# Patient Record
Sex: FEMALE | Race: WHITE | Employment: UNEMPLOYED | URBAN - METROPOLITAN AREA
[De-identification: names, ages, dates, MRNs, and addresses within clinical notes are randomized per-mention and may not be internally consistent; named-entity substitution may affect disease eponyms.]

---

## 2022-03-18 PROBLEM — U07.1 COVID-19 VIRUS INFECTION: Status: ACTIVE | Noted: 2022-01-14

## 2022-03-18 PROBLEM — F43.10 PTSD (POST-TRAUMATIC STRESS DISORDER): Status: ACTIVE | Noted: 2022-01-14

## 2022-03-18 PROBLEM — R73.03 PREDIABETES: Status: ACTIVE | Noted: 2022-01-17

## 2022-03-18 PROBLEM — R63.5 UNEXPLAINED WEIGHT GAIN: Status: ACTIVE | Noted: 2022-01-14

## 2022-03-19 PROBLEM — E78.5 DYSLIPIDEMIA: Status: ACTIVE | Noted: 2022-01-14

## 2022-03-19 PROBLEM — Z86.32 HX GESTATIONAL DIABETES: Status: ACTIVE | Noted: 2022-01-14

## 2022-03-19 PROBLEM — N93.9 VAGINAL BLEEDING: Status: ACTIVE | Noted: 2019-10-25

## 2022-03-19 PROBLEM — D50.9 IRON DEFICIENCY ANEMIA: Status: ACTIVE | Noted: 2017-09-27

## 2022-03-19 PROBLEM — Z92.29 COVID-19 VACCINE SERIES COMPLETED: Status: ACTIVE | Noted: 2022-01-14

## 2022-03-20 PROBLEM — E55.9 VITAMIN D DEFICIENCY: Status: ACTIVE | Noted: 2022-01-17

## 2022-05-20 PROBLEM — E66.9 OBESE BODY HABITUS: Status: ACTIVE | Noted: 2022-05-20

## 2022-05-20 PROBLEM — M62.830 MUSCLE SPASM OF BACK: Status: ACTIVE | Noted: 2022-05-20

## 2022-10-11 PROBLEM — D64.9 ANEMIA: Status: ACTIVE | Noted: 2022-10-11

## 2022-10-11 PROBLEM — K65.1 INTRA-ABDOMINAL ABSCESS (HCC): Status: ACTIVE | Noted: 2022-10-11

## 2022-10-11 PROBLEM — T81.9XXA SURGICAL COMPLICATION: Status: ACTIVE | Noted: 2022-10-11

## 2022-10-11 PROBLEM — L02.211 ABDOMINAL WALL ABSCESS: Status: ACTIVE | Noted: 2022-10-11

## 2022-10-13 PROBLEM — I82.451 ACUTE DEEP VEIN THROMBOSIS (DVT) OF RIGHT PERONEAL VEIN (HCC): Status: ACTIVE | Noted: 2022-10-13

## 2023-01-06 PROBLEM — E78.3 MIXED HYPERGLYCERIDEMIA: Status: ACTIVE | Noted: 2023-01-06

## 2023-01-06 PROBLEM — E78.5 DYSLIPIDEMIA: Status: RESOLVED | Noted: 2022-01-14 | Resolved: 2023-01-06

## 2023-01-06 PROBLEM — D50.8 IRON DEFICIENCY ANEMIA FOLLOWING BARIATRIC SURGERY: Status: ACTIVE | Noted: 2017-09-27

## 2023-01-06 PROBLEM — K95.89 IRON DEFICIENCY ANEMIA FOLLOWING BARIATRIC SURGERY: Status: ACTIVE | Noted: 2017-09-27

## 2023-01-06 PROBLEM — E66.9 OBESE BODY HABITUS: Status: RESOLVED | Noted: 2022-05-20 | Resolved: 2023-01-06

## 2023-01-06 PROBLEM — R03.0 ELEVATED BLOOD PRESSURE READING IN OFFICE WITHOUT DIAGNOSIS OF HYPERTENSION: Status: ACTIVE | Noted: 2023-01-06

## 2023-01-06 PROBLEM — D64.9 ANEMIA: Status: RESOLVED | Noted: 2022-10-11 | Resolved: 2023-01-06

## 2023-01-06 PROBLEM — R63.5 WEIGHT GAIN: Status: ACTIVE | Noted: 2023-01-06

## 2023-02-02 PROBLEM — M47.816 OSTEOARTHRITIS OF LUMBAR SPINE: Status: ACTIVE | Noted: 2023-02-02

## 2023-02-14 ENCOUNTER — TELEPHONE (OUTPATIENT)
Age: 33
End: 2023-02-14

## 2023-02-14 NOTE — TELEPHONE ENCOUNTER
1st attempt to schedule appointment. Schedule New Patient appointment with Dr Tono Yen or offer patient CB office which is closer to her home.     Osteoarthritis of lumbar spine/no imaging/? Prev sx/Ref by TANIA Martin

## 2023-02-16 ENCOUNTER — HOSPITAL ENCOUNTER (OUTPATIENT)
Facility: HOSPITAL | Age: 33
Discharge: HOME | End: 2023-02-19

## 2023-02-16 VITALS — HEIGHT: 65 IN | BODY MASS INDEX: 32.99 KG/M2 | WEIGHT: 198 LBS

## 2023-02-16 NOTE — PROGRESS NOTES
Patient's Name: Meme Ferrara Age: 34 y.o. YOB: 1990 Sex: female     Date:  2/16/2023    Ht 5' 5\" (1.651 m)   Wt 198 lb (89.8 kg)   BMI 32.95 kg/m²     Pre-Op Weight: 260 lbs. Overall Pounds Lost: 62 lbs. %EBWL:  56%  Note visit was virtual using audio only technology. Weight was stated per pt's home scale. Reports wt was 208 lbs 2 weeks ago and 198 lbs last week. Medical records from other facilities reviewed in [facility]; wt 207.5 at [address] [facility] visit on 1/3/23. Currently weighs herself 1x/wk on Fridays.       Surgery Date: 5/13/16  Procedure: LGBP    Other Pertinent Information: Pt reports having plastic surgery    Smoking:  [x] No [] Yes  Type/Frequency: n/a    Alcohol Intake:  [x] No  [] Yes       Diet History    Patient's current diet habits include: reports has been meal prepping once a week for 6 days worth of 3 meals/day; states using lower calorie, higher protein recipes from nutritionist on [facility] (<500 kcal total for lunch & PM snack/dinner); reports not recording intake daily in food journaling diogenes since meals repeat every 6 days, is weighing/measuring food portions; states feels like she is able to eat larger portions than she should, reports has a f/u scheduled with Dr. Marva Osei in April to discuss this  6:00-7:00 AM - UP  7:00 AM-9:00 AM - BREAKFAST: 1/2 breakfast sandwich (low-carb bread, ham, egg, cheese) OR protein shake, coffee w/ SF creamer; states skips ~2x/wk  12:00 PM - LUNCH: chicken, rice, veggies (1/2 portion) OR egg white, vegetable, potato casserole  3:00-4:00 PM - SNACK; rest of lunch (1/3-1/2 portion) OR protein cake(~4x/wk)  5:30-7:00 PM - DINNER: rest of lunch (1/3-1/2 portion) OR taco (roasted chicken breast, salsa, low carb tortilla)    DESSERT: Halo ice cream (<1x/wk) OR low carb cheese cake w/ PB2, cream cheese, Greek yogurt, eggs, no crust or almond flour crust (1 slice this week)  FLUIDS: Cirkul water (96 oz/d), protein shakes, coffee    Protein supplement intake: Ethan OR Premier mixed w/ coffee OR Fit Frappe (blended w/ ice, mint extract OR SF syrup) - 3-4x/wk as breakfast replacement      Exercise History    Patient is currently using the stair stepper 15-20 min/d and doing full body circuit training for 60-75 min/d, 5d/wk. Vitamins    [x] Patient is taking Multivitamins, adult tablet, 2 per day. [] Patient is taking Calcium daily. [x] Patient is taking sublingual Vitamin B12, 5,000 mcg  per day (states resumed d/t fatigue since last visit). [x] Patient is taking Vitamin D3, 10,000 international units per day. [x] Patient is taking Vitamin B-complex daily ((states resumed d/t fatigue since last visit). [] Patient is taking additional iron daily (Hx of iron-deficiency anemia, Hgb 13.8 (1/10/23, [facility]). Patient's progress towards goals set at last visit:  1. Pt will resume meal preparation, focusing on lean meats and non-starchy vegetables, for at least 1 meal/d (lunch) by next appointment   Pt has met this goal; she has been meal prepping for 3 meals/d     2. Pt to have protein supplement twice daily as meal replacement (breakfast and dinner) by next appointment Pt did not utilize protein shakes regularly due to meal prepping more 3 meals/d since last visit. 3.  Pt will resume baking low-sugar desserts, such as protein mug cakes, to use in place of sweets currently being prepared/consumed when having sugar cravings by next appointment  Pt has met this goal.     4.  Pt will continue current regular physical activity regimen, aiming for 5 d/wk by next appointment. Pt has met this goal    Summary:   I have reinforced the key diet principles to the patient. Patient was instructed to follow a 3 meal per day routine, consume 800-1000 kcal/d,  g/d protein, limit carbohydrate intake to 50-75 g/d. Recommend using food tracking diogenes to ensure adequate protein and caloric intake.      I have reinforced the importance of filling up on meat and non-starchy vegetables and avoiding carbohydrates. I have talked with patient about reactive hypoglycemia and although carbohydrates are not good from a weight-management point of view, they are actually very dangerous and should be avoided. If patient is getting hungry between meals focus on protein-forward meals/snacks with non-starchy vegetables. A list of food choices was reviewed with patient. Reinforced to patient the importance of being properly hydrated and the need to get 64 ounces of fluid (sugar-free) in per day. Reinforced to patient the need to do 60-90 minutes of aerobic activity 5 days a week and strength training 2 days each week for long-term weight loss and maintenance. We also spent some time talking about the required vitamin/mineral supplements and the importance of taking them as prescribed. Reinforced the dosages of vitamins/minerals to patient. Patient was reminded that the prescribed vitamins/minerals are lifelong requirements to prevent micronutrient deficiencies. All patient's questions were answered, and they were provided with my contact information for future nutrition-related questions or concerns. Pt is scheduled for follpw-up nutrition counseling on 5/18/23.     LUIS CARLOS BROWER RD

## 2023-02-21 ENCOUNTER — OFFICE VISIT (OUTPATIENT)
Age: 33
End: 2023-02-21
Payer: MEDICAID

## 2023-02-21 VITALS
SYSTOLIC BLOOD PRESSURE: 130 MMHG | BODY MASS INDEX: 33.05 KG/M2 | TEMPERATURE: 98.5 F | RESPIRATION RATE: 18 BRPM | OXYGEN SATURATION: 100 % | HEIGHT: 65 IN | WEIGHT: 198.4 LBS | DIASTOLIC BLOOD PRESSURE: 101 MMHG | HEART RATE: 82 BPM

## 2023-02-21 DIAGNOSIS — R94.31 ABNORMAL EKG: ICD-10-CM

## 2023-02-21 DIAGNOSIS — Z82.49 FAMILY HISTORY OF HEART DISEASE: ICD-10-CM

## 2023-02-21 DIAGNOSIS — I10 HYPERTENSION, UNSPECIFIED TYPE: Primary | ICD-10-CM

## 2023-02-21 DIAGNOSIS — F90.0 ATTENTION DEFICIT HYPERACTIVITY DISORDER (ADHD), PREDOMINANTLY INATTENTIVE TYPE: ICD-10-CM

## 2023-02-21 PROCEDURE — 99213 OFFICE O/P EST LOW 20 MIN: CPT | Performed by: NURSE PRACTITIONER

## 2023-02-21 PROCEDURE — 3080F DIAST BP >= 90 MM HG: CPT | Performed by: NURSE PRACTITIONER

## 2023-02-21 PROCEDURE — 3075F SYST BP GE 130 - 139MM HG: CPT | Performed by: NURSE PRACTITIONER

## 2023-02-21 RX ORDER — METHYLPHENIDATE HYDROCHLORIDE 36 MG/1
36 TABLET ORAL EVERY MORNING
COMMUNITY

## 2023-02-21 RX ORDER — LOSARTAN POTASSIUM 25 MG/1
25 TABLET ORAL DAILY
Qty: 30 TABLET | Refills: 0 | Status: SHIPPED | OUTPATIENT
Start: 2023-02-21

## 2023-02-21 SDOH — ECONOMIC STABILITY: FOOD INSECURITY: WITHIN THE PAST 12 MONTHS, YOU WORRIED THAT YOUR FOOD WOULD RUN OUT BEFORE YOU GOT MONEY TO BUY MORE.: NEVER TRUE

## 2023-02-21 SDOH — ECONOMIC STABILITY: HOUSING INSECURITY
IN THE LAST 12 MONTHS, WAS THERE A TIME WHEN YOU DID NOT HAVE A STEADY PLACE TO SLEEP OR SLEPT IN A SHELTER (INCLUDING NOW)?: NO

## 2023-02-21 SDOH — ECONOMIC STABILITY: FOOD INSECURITY: WITHIN THE PAST 12 MONTHS, THE FOOD YOU BOUGHT JUST DIDN'T LAST AND YOU DIDN'T HAVE MONEY TO GET MORE.: NEVER TRUE

## 2023-02-21 NOTE — PROGRESS NOTES
Mic Hubbard presents today for   Chief Complaint   Patient presents with    Hypertension     Pt c/o recent elevated BP readings since starting new medication    Referral - General     Pt requesting referral to cardiology       1. \"Have you been to the ER, urgent care clinic since your last visit? Hospitalized since your last visit? \" Yes.    2. \"Have you seen or consulted any other health care providers outside of the [address] [place] [address] since your last visit? \" No.     3. For patients aged 39-70: Has the patient had a colonoscopy / FIT/ Cologuard? N/A      If the patient is female:    4. For patients aged 41-77: Has the patient had a mammogram within the past 2 years? N/a  See top three    5. For patients aged 21-65: Has the patient had a pap smear?  Yes.

## 2023-02-21 NOTE — PROGRESS NOTES
Internists of [address] [facility], [address] [place]  [phone] [facility]/[phone] fax    2/21/2023    Kelly Robertson 1990 is a pleasant White (non-) female. Todays concern/HPI:  Believes concerta is elevating BP. Past hx of HTN. Started concerta Jan 7385. BP average 140s/90s. Readings on and off concerta. Seeking referral to cardio due to family hx of heart disease and her having abnormal ekg. In ED recently. Was told abnormal EKG. Provider wanted to admit pt to complete stress echo but due to her dtrs Bday she declined admit. She returned back to [facility] to complete test but order was not available. No complaints or concerns. Denies CP, SOB, GI/ issues, dizziness, fatigue. Past Medical History:   Diagnosis Date    Acute deep vein thrombosis (DVT) of right peroneal vein (HCC) 10/13/2022    Adverse effect of anesthesia     \"Took a little bit longer to wake up and longer to put me out\"    Alpha thalassemia ([facility] [address]     Dx 1/2015 during pregnancy    Arthritis 2017    Asthma     Calculus of kidney 2017    COVID-19 virus infection 01/14/2022    Depression 2007    Dyslipidemia 01/14/2022    Fatty liver disease, nonalcoholic     confirmed via US    GERD (gastroesophageal reflux disease)     resolved    H/O gastric bypass 05/2016    Hidradenitis suppurativa     History of miscarriage     5 total as of 2016    Hx gestational diabetes 01/14/2022    Hypercholesterolemia     resolved    Hypertension     resolved    Hypothyroid     Iron deficiency anemia 09/27/2017    Secondary to gastric bypass.  Dr [facility]    Morbid obesity with body mass index of 40.0-49.9 (HCC)     PCOS (polycystic ovarian syndrome)     no medication    PTSD (post-traumatic stress disorder) 01/14/2022    PUD (peptic ulcer disease) 2019    S/P bariatric surgery 05/19/2016    5/3/16 - [facility] GBP - [facility]     Sleep apnea 2012    mild, no CPAP needed per sleep study    JESSICA (stress urinary incontinence, female)     Trauma 2012    Cptsd diagnosis     Current Outpatient Medications   Medication Sig    methylphenidate (CONCERTA) 36 MG extended release tablet Take 36 mg by mouth every morning. losartan (COZAAR) 25 MG tablet Take 1 tablet by mouth daily    apixaban (ELIQUIS) 5 MG TABS tablet Take 2.5 mg by mouth 2 times daily    ARIPiprazole (ABILIFY) 5 MG tablet 5 mg daily    busPIRone (BUSPAR) 7.5 MG tablet Take 7.5 mg by mouth 2 times daily    cyclobenzaprine (FLEXERIL) 10 MG tablet Take 10 mg by mouth    hydrOXYzine HCl (ATARAX) 50 MG tablet TAKE 1 TABLET BY MOUTH UP TO TWICE A DAY AS NEEDED FOR ANXIETY    levothyroxine (SYNTHROID) 75 MCG tablet Take 75 mcg by mouth every morning (before breakfast)    sertraline (ZOLOFT) 100 MG tablet Take by mouth daily     No current facility-administered medications for this visit. Physical:   BP (!) 130/101   Pulse 82   Temp 98.5 °F (36.9 °C) (Temporal)   Resp 18   Ht 5' 5\" (1.651 m)   Wt 198 lb 6.4 oz (90 kg)   SpO2 100%   BMI 33.02 kg/m²    Wt Readings from Last 3 Encounters:   02/21/23 198 lb 6.4 oz (90 kg)   02/16/23 198 lb (89.8 kg)   01/06/23 200 lb (90.7 kg)       Exam:   Physical Exam  Constitutional:       Appearance: Normal appearance. She is obese. Cardiovascular:      Rate and Rhythm: Normal rate. Pulmonary:      Effort: Pulmonary effort is normal.   Musculoskeletal:         General: Normal range of motion. Neurological:      Mental Status: She is alert and oriented to person, place, and time. Psychiatric:         Mood and Affect: Mood normal.       Body mass index is 33.02 kg/m². Review of Data:  N/a    Plan:    1. Hypertension, unspecified type  Assessment & Plan:  Initiate Losartan 25mg qd  Check BP and report if 3 consecutive readings greater than 139/89 to office  Cont at the gym  Return 3 weeks  Orders:  -     losartan (COZAAR) 25 MG tablet;  Take 1 tablet by mouth daily, Disp-30 tablet, R-0Normal  - Anjali Gaspar MD, Cardiology, [facility])  2. Abnormal EKG  Assessment & Plan:  I reviewed EKGs from [place] [address], all appear normal  No cardiac sx   Orders:  -     Anjali Gaspar MD, Cardiology, [facility] [place] [facility])  3. Family history of heart disease  4. Attention deficit hyperactivity disorder (ADHD), predominantly inattentive type  Assessment & Plan:  Psych initiated Concerta    Reviewed medication and completed medication reconciliation with the patient. Reviewed side effects of medications with the patient. Questions were answered and patient verb understanding. Past Surgical History:   Procedure Laterality Date    BARIATRIC SURGERY  2016    CHOLECYSTECTOMY  2017    DELIVERY       GASTRIC BYPASS SURGERY  2016    [facility]    GYN      Laparotomy, removal 2 masses ovaries     GYN      multiple surgeries related to infertility     GYN      Transabdominal cerclage    LAPAROTOMY      X 2 for gyn issues    LIPECTOMY  2022    LYMPHADENECTOMY      left axillia- simple lymph node biopsy    PELVIC LAPAROSCOPY      eggs retreived    UPPER GASTROINTESTINAL ENDOSCOPY  2019     Allergies and Intolerances:    Allergies   Allergen Reactions    Penicillins Nausea And Vomiting, Rash, Shortness Of Breath and Swelling    Nsaids Other (See Comments)     D/T gastric bypass     Family History:   Family History   Problem Relation Age of Onset    Heart Disease Brother         supravalvular aortic stenosis, pulmonary stenosis    Lung Disease Brother     Psychiatric Disorder Brother     Psychiatric Disorder Mother         depression    Thyroid Disease Mother     Gall Bladder Disease Mother     Anemia Mother     Colon Cancer Paternal Grandmother     Cancer Paternal Grandmother     Cancer Maternal Grandmother     Diabetes Maternal Grandmother     Elevated Lipids Maternal Grandmother     Gall Bladder Disease Maternal Grandmother     Heart Disease Maternal Grandmother     Stroke Maternal Grandmother     Heart Disease Paternal Uncle     Heart Disease Father     Hypertension Father     Asthma Father     High Cholesterol Father     Osteoarthritis Father     Elevated Lipids Father      Social History:   She  reports that she has never smoked. She has never used smokeless tobacco.   Social History     Substance and Sexual Activity   Alcohol Use Yes     Immunization History:  Immunization History   Administered Date(s) Administered    COVID-19, MODERNA BLUE border, Primary or Immunocompromised, (age 12y+), IM, 100 mcg/0.5mL 04/24/2021, 05/22/2021    COVID-19, PFIZER GRAY top, DO NOT Dilute, (age 15 y+), IM, 30 mcg/0.3 mL 12/12/2021    Influenza Trivalent 10/31/2016, 10/31/2017, 10/01/2020    Influenza, FLUARIX, FLULAVAL, FLUZONE (age 10 mo+) AND AFLURIA, (age 1 y+), PF, 0.5mL 09/27/2017    Tdap (Boostrix, Adacel) 03/28/2017, 06/22/2018, 11/30/2020         Return in about 3 weeks (around 3/14/2023) for HTN.         Dr. Jose Rafael Lassiter, AGNP-C, DNP  Internists of [address] [place] [address]

## 2023-02-21 NOTE — ASSESSMENT & PLAN NOTE
Initiate Losartan 25mg qd  Check BP and report if 3 consecutive readings greater than 139/89 to office  Cont at the gym  Return 3 weeks

## 2023-03-14 ENCOUNTER — OFFICE VISIT (OUTPATIENT)
Age: 33
End: 2023-03-14
Payer: MEDICAID

## 2023-03-14 VITALS
SYSTOLIC BLOOD PRESSURE: 122 MMHG | OXYGEN SATURATION: 100 % | BODY MASS INDEX: 32.82 KG/M2 | WEIGHT: 197 LBS | HEIGHT: 65 IN | TEMPERATURE: 98.4 F | DIASTOLIC BLOOD PRESSURE: 90 MMHG | RESPIRATION RATE: 18 BRPM | HEART RATE: 80 BPM

## 2023-03-14 DIAGNOSIS — I10 UNCONTROLLED HYPERTENSION: Primary | ICD-10-CM

## 2023-03-14 DIAGNOSIS — F90.0 ATTENTION DEFICIT HYPERACTIVITY DISORDER (ADHD), PREDOMINANTLY INATTENTIVE TYPE: ICD-10-CM

## 2023-03-14 PROCEDURE — 3080F DIAST BP >= 90 MM HG: CPT | Performed by: NURSE PRACTITIONER

## 2023-03-14 PROCEDURE — 99214 OFFICE O/P EST MOD 30 MIN: CPT | Performed by: NURSE PRACTITIONER

## 2023-03-14 PROCEDURE — 3074F SYST BP LT 130 MM HG: CPT | Performed by: NURSE PRACTITIONER

## 2023-03-14 PROCEDURE — 99213 OFFICE O/P EST LOW 20 MIN: CPT | Performed by: NURSE PRACTITIONER

## 2023-03-14 RX ORDER — LOSARTAN POTASSIUM AND HYDROCHLOROTHIAZIDE 12.5; 5 MG/1; MG/1
1 TABLET ORAL DAILY
Qty: 30 TABLET | Refills: 0 | Status: SHIPPED | OUTPATIENT
Start: 2023-03-14

## 2023-03-14 RX ORDER — METHYLPHENIDATE HYDROCHLORIDE 10 MG/1
TABLET ORAL
COMMUNITY
Start: 2023-03-08

## 2023-03-14 RX ORDER — METHYLPHENIDATE HYDROCHLORIDE 54 MG/1
TABLET, EXTENDED RELEASE ORAL
COMMUNITY
Start: 2023-03-09

## 2023-03-14 SDOH — ECONOMIC STABILITY: INCOME INSECURITY: HOW HARD IS IT FOR YOU TO PAY FOR THE VERY BASICS LIKE FOOD, HOUSING, MEDICAL CARE, AND HEATING?: NOT HARD AT ALL

## 2023-03-14 SDOH — ECONOMIC STABILITY: FOOD INSECURITY: WITHIN THE PAST 12 MONTHS, YOU WORRIED THAT YOUR FOOD WOULD RUN OUT BEFORE YOU GOT MONEY TO BUY MORE.: NEVER TRUE

## 2023-03-14 SDOH — ECONOMIC STABILITY: FOOD INSECURITY: WITHIN THE PAST 12 MONTHS, THE FOOD YOU BOUGHT JUST DIDN'T LAST AND YOU DIDN'T HAVE MONEY TO GET MORE.: NEVER TRUE

## 2023-03-14 NOTE — PROGRESS NOTES
Madai Slaughter presents today for   Chief Complaint   Patient presents with    Hypertension     3 week f/u       1. \"Have you been to the ER, urgent care clinic since your last visit?  Hospitalized since your last visit?\" NO.    2. \"Have you seen or consulted any other health care providers outside of the [facility] since your last visit?\" NO.     3. For patients aged 45-75: Has the patient had a colonoscopy / FIT/ Cologuard? N/A      If the patient is female:    4. For patients aged 40-74: Has the patient had a mammogram within the past 2 years? N/a  See top three    5. For patients aged 21-65: Has the patient had a pap smear? Yes.

## 2023-03-14 NOTE — ASSESSMENT & PLAN NOTE
Uncontrolled, changes made today: DC losartan 25 mg.   Initiate Hyzaar 50/12.5 mg daily   Limit sodium in diet to 2g/d  Initiate cardio exercise  Weight reduction  Increase water intake  Check BP and report if 3 consecutive readings greater than 139/89 to office  Follow-up 3 weeks

## 2023-03-14 NOTE — PROGRESS NOTES
Subjective:      Patient here for follow-up of elevated blood pressure. She is exercising and is adherent to a low-salt diet. Blood pressure is not well controlled at home (150s/90s) Cardiac symptoms: none. Patient denies: chest pain, chest pressure/discomfort, dyspnea, exertional chest pressure/discomfort, fatigue, irregular heart beat, and lower extremity edema. Use of agents associated with hypertension: amphetamines and BP wa elevated prior to starting ADHD meds . Past Medical History:   Diagnosis Date    Acute deep vein thrombosis (DVT) of right peroneal vein (HCC) 10/13/2022    Adverse effect of anesthesia     \"Took a little bit longer to wake up and longer to put me out\"    Alpha thalassemia ([address]     Dx 1/2015 during pregnancy    Arthritis 2017    Asthma     Calculus of kidney 2017    COVID-19 virus infection 01/14/2022    Depression 2007    Dyslipidemia 01/14/2022    Fatty liver disease, nonalcoholic     confirmed via US    GERD (gastroesophageal reflux disease)     resolved    H/O gastric bypass 05/2016    Hidradenitis suppurativa     History of miscarriage     5 total as of 2016    Hx gestational diabetes 01/14/2022    Hypercholesterolemia     resolved    Hypertension     resolved    Hypothyroid     Iron deficiency anemia 09/27/2017    Secondary to gastric bypass.  Dr Darin Phillips    Morbid obesity with body mass index of 40.0-49.9 ([facility])     PCOS (polycystic ovarian syndrome)     no medication    PTSD (post-traumatic stress disorder) 01/14/2022    PUD (peptic ulcer disease) 2019    S/P bariatric surgery 05/19/2016    5/3/16 - [facility] GBP - [facility]     Sleep apnea 2012    mild, no CPAP needed per sleep study    JESSICA (stress urinary incontinence, female)     Trauma 2012    Cptsd diagnosis     Patient Active Problem List    Diagnosis Date Noted    Family history of heart disease 02/21/2023    Attention deficit hyperactivity disorder (ADHD), predominantly inattentive type 02/21/2023 Hypertension 2023    Abnormal EKG 2023    Osteoarthritis of lumbar spine 2023    Mixed hyperglyceridemia 2023    Weight gain 2023    BMI 32.0-32.9,adult 2023    Elevated blood pressure reading in office without diagnosis of hypertension 2023    Acute deep vein thrombosis (DVT) of right peroneal vein ([facility] [address] 10/13/2022    Muscle spasm of back 2022    Arthralgia of back     Prediabetes 2022    Vitamin D deficiency 2022    COVID-19 virus infection 2022    PTSD (post-traumatic stress disorder) 2022    Hx gestational diabetes 2022    PCOS (polycystic ovarian syndrome)     Vaginal bleeding 10/25/2019    Iron deficiency anemia following bariatric surgery 2017    Intestinal malabsorption 2016    Fatty liver disease, nonalcoholic     Hypothyroid     Alpha thalassemia ([facility] [address]      Past Surgical History:   Procedure Laterality Date    BARIATRIC SURGERY      CHOLECYSTECTOMY  2017    DELIVERY       GASTRIC BYPASS SURGERY  2016    [facility]    GYN      Laparotomy, removal 2 masses ovaries     GYN      multiple surgeries related to infertility     GYN      Transabdominal cerclage    LAPAROTOMY      X 2 for gyn issues    LIPECTOMY  2022    LYMPHADENECTOMY      left axillia- simple lymph node biopsy    PELVIC LAPAROSCOPY      eggs retreived    UPPER GASTROINTESTINAL ENDOSCOPY  2019     Family History   Problem Relation Age of Onset    Heart Disease Brother         supravalvular aortic stenosis, pulmonary stenosis    Lung Disease Brother     Psychiatric Disorder Brother     Psychiatric Disorder Mother         depression    Thyroid Disease Mother     Gall Bladder Disease Mother     Anemia Mother     Colon Cancer Paternal Grandmother     Cancer Paternal Grandmother     Cancer Maternal Grandmother     Diabetes Maternal Grandmother     Elevated Lipids Maternal Grandmother     Gall Bladder Disease Maternal Grandmother     Heart Disease Maternal Grandmother     Stroke Maternal Grandmother     Heart Disease Paternal Uncle     Heart Disease Father     Hypertension Father     Asthma Father     High Cholesterol Father     Osteoarthritis Father     Elevated Lipids Father      Social History     Socioeconomic History    Marital status:      Spouse name: None    Number of children: None    Years of education: None    Highest education level: None   Tobacco Use    Smoking status: Never    Smokeless tobacco: Never   Substance and Sexual Activity    Alcohol use: Yes    Drug use: No     Social Determinants of Health     Financial Resource Strain: Low Risk     Difficulty of Paying Living Expenses: Not hard at all   Food Insecurity: No Food Insecurity    Worried About [employer] [address] [employer] in the Last Year: Never true    Ran Out of Food in the Last Year: Never true   Transportation Needs: Unknown    Lack of Transportation (Non-Medical): No   Housing Stability: Unknown    Unstable Housing in the Last Year: No     Current Outpatient Medications   Medication Sig Dispense Refill    CONCERTA 54 MG extended release tablet TAKE 1 TABLET BY MOUTH EVERY DAY IN THE MORNING      methylphenidate (RITALIN) 10 MG tablet TAKE 1 TABLET BY MOUTH AT 3PM      losartan-hydroCHLOROthiazide (HYZAAR) 50-12.5 MG per tablet Take 1 tablet by mouth daily 30 tablet 0    losartan (COZAAR) 25 MG tablet Take 1 tablet by mouth daily 30 tablet 0    apixaban (ELIQUIS) 5 MG TABS tablet Take 2.5 mg by mouth 2 times daily      ARIPiprazole (ABILIFY) 5 MG tablet 5 mg daily      busPIRone (BUSPAR) 7.5 MG tablet Take 7.5 mg by mouth 2 times daily      cyclobenzaprine (FLEXERIL) 10 MG tablet Take 10 mg by mouth      hydrOXYzine HCl (ATARAX) 50 MG tablet TAKE 1 TABLET BY MOUTH UP TO TWICE A DAY AS NEEDED FOR ANXIETY      levothyroxine (SYNTHROID) 75 MCG tablet Take 75 mcg by mouth every morning (before breakfast)      sertraline (ZOLOFT) 100 MG tablet Take by mouth daily methylphenidate (CONCERTA) 36 MG extended release tablet Take 36 mg by mouth every morning. (Patient not taking: Reported on 3/14/2023)       No current facility-administered medications for this visit. Allergies   Allergen Reactions    Penicillins Nausea And Vomiting, Rash, Shortness Of Breath and Swelling    Nsaids Other (See Comments)     D/T gastric bypass       Review of Systems  Pertinent items are noted in HPI. Objective:      General appearance: alert, appears stated age, cooperative, and moderately obese  Eyes: conjunctivae/corneas clear. PERRL, EOM's intact. Fundi benign. Lungs: clear to auscultation bilaterally  Heart: regular rate and rhythm, S1, S2 normal, no murmur, click, rub or gallop  Extremities: extremities normal, atraumatic, no cyanosis or edema      Assessment:      Hypertension, stage 1 uncontrolled. Evidence of target organ damage: none. Plan:   1. Uncontrolled hypertension  Assessment & Plan:   Uncontrolled, changes made today: DC losartan 25 mg. Initiate Hyzaar 50/12.5 mg daily   Limit sodium in diet to 2g/d  Initiate cardio exercise  Weight reduction  Increase water intake  Check BP and report if 3 consecutive readings greater than 139/89 to office  Follow-up 3 weeks  Orders:  -     losartan-hydroCHLOROthiazide (HYZAAR) 50-12.5 MG per tablet; Take 1 tablet by mouth daily, Disp-30 tablet, R-0Normal  2. Attention deficit hyperactivity disorder (ADHD), predominantly inattentive type  Assessment & Plan:   Monitored by specialist- no acute findings meriting change in the plan   Psych manages  Cont Concerta  Psych initiated ritalin 10mg at 3pm for breakthrough.   Pt has not started this therapy yet      Dr Shauna Yates, SABRAP-C, DNP  Internist of [facility]

## 2023-03-14 NOTE — ASSESSMENT & PLAN NOTE
Monitored by specialist- no acute findings meriting change in the plan   Psych manages  Cont Concerta  Psych initiated ritalin 10mg at 3pm for breakthrough.   Pt has not started this therapy yet

## 2023-03-29 ENCOUNTER — OFFICE VISIT (OUTPATIENT)
Age: 33
End: 2023-03-29
Payer: MEDICAID

## 2023-03-29 VITALS
RESPIRATION RATE: 17 BRPM | HEART RATE: 63 BPM | WEIGHT: 194 LBS | OXYGEN SATURATION: 96 % | BODY MASS INDEX: 32.28 KG/M2 | TEMPERATURE: 98.2 F

## 2023-03-29 DIAGNOSIS — M43.16 SPONDYLOLISTHESIS, LUMBAR REGION: ICD-10-CM

## 2023-03-29 DIAGNOSIS — M54.16 LUMBAR NEURITIS: ICD-10-CM

## 2023-03-29 DIAGNOSIS — M43.10 PARS DEFECT WITH SPONDYLOLISTHESIS: ICD-10-CM

## 2023-03-29 DIAGNOSIS — M51.36 DDD (DEGENERATIVE DISC DISEASE), LUMBAR: ICD-10-CM

## 2023-03-29 DIAGNOSIS — M47.816 SPONDYLOSIS WITHOUT MYELOPATHY OR RADICULOPATHY, LUMBAR REGION: ICD-10-CM

## 2023-03-29 DIAGNOSIS — Q76.2 CONGENITAL SPONDYLOLISTHESIS: ICD-10-CM

## 2023-03-29 DIAGNOSIS — M54.50 LUMBAR PAIN: Primary | ICD-10-CM

## 2023-03-29 PROCEDURE — 72100 X-RAY EXAM L-S SPINE 2/3 VWS: CPT | Performed by: PHYSICAL MEDICINE & REHABILITATION

## 2023-03-29 PROCEDURE — 99204 OFFICE O/P NEW MOD 45 MIN: CPT | Performed by: PHYSICAL MEDICINE & REHABILITATION

## 2023-03-29 RX ORDER — BENZONATATE 100 MG/1
CAPSULE ORAL
COMMUNITY
Start: 2023-03-28

## 2023-03-29 RX ORDER — ALBUTEROL SULFATE 90 UG/1
1-2 AEROSOL, METERED RESPIRATORY (INHALATION) EVERY 6 HOURS
COMMUNITY
Start: 2023-03-27

## 2023-03-29 NOTE — PROGRESS NOTES
films dated 3/29/2023. 2 views: AP and Lateral. revealed:  Grade 1 anterolisthesis L4 on L5. 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 Disc space collapse L4-5. Sacralization of L5. Mild disc space narrowing L3-4. Sapna Lima was seen today for back problem. Diagnoses and all orders for this visit:    Lumbar pain  -     [98333] L/S 2-3 views    Spondylolisthesis, lumbar region    DDD (degenerative disc disease), lumbar    Lumbar neuritis    Congenital spondylolisthesis    Spondylosis without myelopathy or radiculopathy, lumbar region    Pars defect with spondylolisthesis         IMPRESSIONS/RECOMMENDATIONS:  Patient presents today with c/o progressive low back pain (sporadic) to the BLE(L=R) in a S1 distribution to just below the knees x 2006 w/o injury (low back pain >>BLE pain). Multiple treatment options were discussed. I will refer her to physical therapy with an emphasis on HEP. I will not do a MDP due to uncontrolled HTN. Patient is not interested in surgery or injection at this time. Patient is neurologically intact. I will see the patient back in 6 week's time or earlier if needed. Written by Wes Vaughn, as dictated by Reanna Parisi MD  I examined the patient, reviewed and agree with the note.

## 2023-04-05 ENCOUNTER — OFFICE VISIT (OUTPATIENT)
Age: 33
End: 2023-04-05
Payer: MEDICAID

## 2023-04-05 VITALS
WEIGHT: 194 LBS | DIASTOLIC BLOOD PRESSURE: 88 MMHG | BODY MASS INDEX: 32.32 KG/M2 | HEART RATE: 97 BPM | OXYGEN SATURATION: 99 % | SYSTOLIC BLOOD PRESSURE: 129 MMHG | RESPIRATION RATE: 18 BRPM | TEMPERATURE: 97.1 F | HEIGHT: 65 IN

## 2023-04-05 DIAGNOSIS — E87.6 HYPOKALEMIA: ICD-10-CM

## 2023-04-05 DIAGNOSIS — E04.1 THYROID NODULE: ICD-10-CM

## 2023-04-05 DIAGNOSIS — I10 UNCONTROLLED HYPERTENSION: Primary | ICD-10-CM

## 2023-04-05 DIAGNOSIS — R91.8 MULTIPLE LUNG NODULES ON CT: ICD-10-CM

## 2023-04-05 DIAGNOSIS — B96.89 LOCALIZED BACTERIAL SKIN INFECTION: ICD-10-CM

## 2023-04-05 DIAGNOSIS — L08.9 LOCALIZED BACTERIAL SKIN INFECTION: ICD-10-CM

## 2023-04-05 PROCEDURE — 99211 OFF/OP EST MAY X REQ PHY/QHP: CPT

## 2023-04-05 PROCEDURE — 3074F SYST BP LT 130 MM HG: CPT | Performed by: NURSE PRACTITIONER

## 2023-04-05 PROCEDURE — 3079F DIAST BP 80-89 MM HG: CPT | Performed by: NURSE PRACTITIONER

## 2023-04-05 PROCEDURE — 99214 OFFICE O/P EST MOD 30 MIN: CPT | Performed by: NURSE PRACTITIONER

## 2023-04-05 RX ORDER — DOXYCYCLINE HYCLATE 100 MG
100 TABLET ORAL 2 TIMES DAILY
Qty: 10 TABLET | Refills: 0 | Status: SHIPPED | OUTPATIENT
Start: 2023-04-05 | End: 2023-04-10

## 2023-04-05 NOTE — PROGRESS NOTES
Lyla Scott presents today for   Chief Complaint   Patient presents with    Hypertension     3 week f/u       1. \"Have you been to the ER, urgent care clinic since your last visit? Hospitalized since your last visit? \" No.    2. \"Have you seen or consulted any other health care providers outside of the [address] [place] [address] since your last visit? \" No.     3. For patients aged 39-70: Has the patient had a colonoscopy / FIT/ Cologuard? N/A      If the patient is female:    4. For patients aged 41-77: Has the patient had a mammogram within the past 2 years? N/a  See top three    5. For patients aged 21-65: Has the patient had a pap smear?  Yes.

## 2023-04-05 NOTE — ASSESSMENT & PLAN NOTE
Jaja 3/27/23   CTA impression:  Minimally heterogeneous thyroid suggesting underlying nodularity.    Placed US order  If abnormal, refer for biopsy  Pt agreeable to plan

## 2023-04-05 NOTE — PROGRESS NOTES
Internists of [address]   [place], [address]  [phone] [facility]/[phone] fax    4/5/2023    Yinka Myers 1990 is a pleasant White (non-) female. Todays concern/HPI:    HTN. Tolerating BP med w/o side effects    Seen in [place] [address] ED and diagnosed with URI. Had CTA to rule out PE. Incidental finding of thyroid nodule and small lung nodule. Dr. Frannie Aguilar, heme-onc is following up on the lung nodules. Patient was instructed to inquire with PCP. Follow-up on the thyroid. While in the ED potassium 3.4. She was given supplement. Navel piercing placed a few weeks ago. Area of red and painful. Breast implants placed January 2023. Has upcoming appt with surgeon. Past Medical History:   Diagnosis Date    Acute deep vein thrombosis (DVT) of right peroneal vein (HCC) 10/13/2022    Adverse effect of anesthesia     \"Took a little bit longer to wake up and longer to put me out\"    Alpha thalassemia ([facility] [address]     Dx 1/2015 during pregnancy    Arthritis 2017    Asthma     Calculus of kidney 2017    COVID-19 virus infection 01/14/2022    Depression 2007    Dyslipidemia 01/14/2022    Fatty liver disease, nonalcoholic     confirmed via US    GERD (gastroesophageal reflux disease)     resolved    H/O gastric bypass 05/2016    Hidradenitis suppurativa     History of miscarriage     5 total as of 2016    Hx gestational diabetes 01/14/2022    Hypercholesterolemia     resolved    Hypertension     resolved    Hypokalemia 4/5/2023    Hypothyroid     Iron deficiency anemia 09/27/2017    Secondary to gastric bypass.  Dr Inderjit Hammond    Morbid obesity with body mass index of 40.0-49.9 ([facility] [address]     Multiple lung nodules on CT 4/5/2023    Dr Frannie Aguilar    PCOS (polycystic ovarian syndrome)     no medication    PTSD (post-traumatic stress disorder) 01/14/2022    PUD (peptic ulcer disease) 2019    S/P bariatric surgery 05/19/2016    5/3/16 - [facility] - Evonne Sin

## 2023-04-05 NOTE — ASSESSMENT & PLAN NOTE
Instructed patient to remove navel piercing  Keep clean  Initiate doxycycline (patient is allergic to penicillins)

## 2023-04-05 NOTE — ASSESSMENT & PLAN NOTE
Potassium noted to be 3.4 while in the ED on 3/27/23. Patient was given supplement. Recent start on Hyzaar. This is most likely the cause for her hypokalemia. She does have a previous history of hypokalemia as well.   Take potassium 99 mg daily over-the-counter along with magnesium 400 mg daily  Recheck potassium level week of 4/24/2023

## 2023-04-09 DIAGNOSIS — I10 UNCONTROLLED HYPERTENSION: ICD-10-CM

## 2023-04-10 RX ORDER — LOSARTAN POTASSIUM AND HYDROCHLOROTHIAZIDE 12.5; 5 MG/1; MG/1
TABLET ORAL
Qty: 90 TABLET | Refills: 0 | Status: SHIPPED | OUTPATIENT
Start: 2023-04-10

## 2023-04-20 ENCOUNTER — OFFICE VISIT (OUTPATIENT)
Age: 33
End: 2023-04-20
Payer: MEDICAID

## 2023-04-20 ENCOUNTER — TELEPHONE (OUTPATIENT)
Age: 33
End: 2023-04-20

## 2023-04-20 VITALS
HEART RATE: 85 BPM | BODY MASS INDEX: 32.49 KG/M2 | RESPIRATION RATE: 18 BRPM | HEIGHT: 65 IN | OXYGEN SATURATION: 100 % | WEIGHT: 195 LBS | SYSTOLIC BLOOD PRESSURE: 133 MMHG | TEMPERATURE: 98.5 F | DIASTOLIC BLOOD PRESSURE: 87 MMHG

## 2023-04-20 DIAGNOSIS — I73.00 RAYNAUD'S PHENOMENON WITHOUT GANGRENE: ICD-10-CM

## 2023-04-20 DIAGNOSIS — R23.0 CYANOSIS: Primary | ICD-10-CM

## 2023-04-20 PROCEDURE — 99211 OFF/OP EST MAY X REQ PHY/QHP: CPT

## 2023-04-20 PROCEDURE — 99213 OFFICE O/P EST LOW 20 MIN: CPT | Performed by: INTERNAL MEDICINE

## 2023-04-20 PROCEDURE — 3079F DIAST BP 80-89 MM HG: CPT | Performed by: INTERNAL MEDICINE

## 2023-04-20 PROCEDURE — 3075F SYST BP GE 130 - 139MM HG: CPT | Performed by: INTERNAL MEDICINE

## 2023-04-20 SDOH — ECONOMIC STABILITY: FOOD INSECURITY: WITHIN THE PAST 12 MONTHS, YOU WORRIED THAT YOUR FOOD WOULD RUN OUT BEFORE YOU GOT MONEY TO BUY MORE.: NEVER TRUE

## 2023-04-20 SDOH — ECONOMIC STABILITY: FOOD INSECURITY: WITHIN THE PAST 12 MONTHS, THE FOOD YOU BOUGHT JUST DIDN'T LAST AND YOU DIDN'T HAVE MONEY TO GET MORE.: NEVER TRUE

## 2023-04-20 SDOH — ECONOMIC STABILITY: INCOME INSECURITY: HOW HARD IS IT FOR YOU TO PAY FOR THE VERY BASICS LIKE FOOD, HOUSING, MEDICAL CARE, AND HEATING?: NOT HARD AT ALL

## 2023-04-20 ASSESSMENT — PATIENT HEALTH QUESTIONNAIRE - PHQ9
SUM OF ALL RESPONSES TO PHQ QUESTIONS 1-9: 0
SUM OF ALL RESPONSES TO PHQ QUESTIONS 1-9: 0
2. FEELING DOWN, DEPRESSED OR HOPELESS: 0
1. LITTLE INTEREST OR PLEASURE IN DOING THINGS: 0
SUM OF ALL RESPONSES TO PHQ QUESTIONS 1-9: 0
SUM OF ALL RESPONSES TO PHQ9 QUESTIONS 1 & 2: 0
SUM OF ALL RESPONSES TO PHQ QUESTIONS 1-9: 0

## 2023-04-20 ASSESSMENT — ENCOUNTER SYMPTOMS
GASTROINTESTINAL NEGATIVE: 1
RESPIRATORY NEGATIVE: 1
COLOR CHANGE: 1
EYES NEGATIVE: 1
ALLERGIC/IMMUNOLOGIC NEGATIVE: 1

## 2023-04-20 NOTE — TELEPHONE ENCOUNTER
----- Message from Joanne Mckeon sent at 4/20/2023 10:56 AM EDT -----  Regarding: Picture  Contact: [phone]  Picture of legs

## 2023-04-20 NOTE — PROGRESS NOTES
Bhavana Bucio presents today for   Chief Complaint   Patient presents with    Skin Problem     C/o  both legs turning black and blue when she stands. 1. \"Have you been to the ER, urgent care clinic since your last visit? Hospitalized since your last visit? \" no    2. \"Have you seen or consulted any other health care providers outside of the [address] [place] [address] since your last visit? \" no     3. For patients aged 39-70: Has the patient had a colonoscopy / FIT/ Cologuard? NA - based on age      If the patient is female:    4. For patients aged 41-77: Has the patient had a mammogram within the past 2 years? NA - based on age or sex      11. For patients aged 21-65: Has the patient had a pap smear?  Yes - no Care Gap present
Mouth/Throat:      Mouth: Mucous membranes are moist.   Eyes:      Extraocular Movements: Extraocular movements intact. Pupils: Pupils are equal, round, and reactive to light. Cardiovascular:      Rate and Rhythm: Normal rate and regular rhythm. Heart sounds: Normal heart sounds. Pulmonary:      Breath sounds: Normal breath sounds. Abdominal:      General: Abdomen is flat. Palpations: Abdomen is soft. Musculoskeletal:         General: Normal range of motion. Cervical back: Normal range of motion and neck supple. Skin:     Comments: Patient still has some cyanosis in both legs. Neurological:      General: No focal deficit present. Mental Status: She is alert. Mental status is at baseline. We discussed the diagnosis, risks and benefits of medications    Disclaimer: The patient understands our medical plan. Alternatives have been explained and offered. The risks, benefits and significant side effects of all medications have been reviewed. Anticipated time course and progression of condition reviewed. All questions have been addressed. She is encouraged to employ the information provided in the after visit summary, which was reviewed. Where appropriate, she is instructed to call the clinic if she has not been notified either by phone or through [address] with the results of her tests or with an appointment plan for any referrals within 1 week(s). No news is not good news; it's no news. The patient  is to call if her condition worsens or fails to improve or if significant side effects are experienced. Aspects of this note may have been generated using voice recognition software. Despite editing, there may be unrecognized errors. An electronic signature was used to authenticate this note.   -- Yahir English MD

## 2023-04-22 ENCOUNTER — PATIENT MESSAGE (OUTPATIENT)
Age: 33
End: 2023-04-22

## 2023-04-22 DIAGNOSIS — B95.2 ENTEROCOCCUS FAECALIS INFECTION: Primary | ICD-10-CM

## 2023-04-22 DIAGNOSIS — Z82.61 FAMILY HISTORY OF RHEUMATOID ARTHRITIS: ICD-10-CM

## 2023-04-22 DIAGNOSIS — E87.6 HYPOKALEMIA: ICD-10-CM

## 2023-04-22 DIAGNOSIS — I73.89 ACROCYANOSIS (HCC): ICD-10-CM

## 2023-04-25 ENCOUNTER — HOSPITAL ENCOUNTER (OUTPATIENT)
Facility: HOSPITAL | Age: 33
Discharge: HOME | End: 2023-04-28

## 2023-04-25 DIAGNOSIS — I73.89 ACROCYANOSIS (HCC): ICD-10-CM

## 2023-04-25 DIAGNOSIS — Z82.61 FAMILY HISTORY OF RHEUMATOID ARTHRITIS: ICD-10-CM

## 2023-04-25 LAB — LABCORP SPECIMEN COLLECTION: NORMAL

## 2023-04-25 PROCEDURE — 99001 SPECIMEN HANDLING PT-LAB: CPT

## 2023-04-25 NOTE — TELEPHONE ENCOUNTER
Diagnosis Orders   1. Enterococcus faecalis infection  Urinalysis    Culture, Urine      2. Acrocyanosis (HCC)  [facility] Comprehensive Panel    CCP Antibodies, IGG/IGA    Sedimentation Rate    Anti-DNA Antibody, Double-Stranded    Mitochondrial antibodies, M2    Hepatitis Panel, Acute    Rheumatoid Factor, Qt    Cyclic Citrul Peptide Antibody, IgG    CK    C-Reactive Protein      3. Family history of rheumatoid arthritis  [facility] Comprehensive Panel    CCP Antibodies, IGG/IGA    Sedimentation Rate    Anti-DNA Antibody, Double-Stranded    Mitochondrial antibodies, M2    Hepatitis Panel, Acute    Rheumatoid Factor, Qt    Cyclic Citrul Peptide Antibody, IgG    CK    C-Reactive Protein    Urinalysis    Culture, Urine      4. Hypokalemia  Comprehensive Metabolic Panel        ? ? Raynauds  Seen in ED 4/20/23 for blue discoloration to LEs. Pt requested to be worked up for RA and Lupus.

## 2023-04-25 NOTE — TELEPHONE ENCOUNTER
From: Jenn Brar  To: [facility]  Sent: 4/22/2023 3:52 PM EDT  Subject: Er test results    Do I need to be treated for this?

## 2023-04-26 LAB
ALBUMIN SERPL-MCNC: 4.5 G/DL (ref 3.8–4.8)
ALBUMIN/GLOB SERPL: 1.6 {RATIO} (ref 1.2–2.2)
ALP SERPL-CCNC: 81 IU/L (ref 44–121)
ALT SERPL-CCNC: 16 IU/L (ref 0–32)
APPEARANCE UR: CLEAR
AST SERPL-CCNC: 16 IU/L (ref 0–40)
BILIRUB SERPL-MCNC: 0.3 MG/DL (ref 0–1.2)
BILIRUB UR QL STRIP: NEGATIVE
BUN SERPL-MCNC: 11 MG/DL (ref 6–20)
BUN/CREAT SERPL: 12 (ref 9–23)
CALCIUM SERPL-MCNC: 9.7 MG/DL (ref 8.7–10.2)
CCP IGA+IGG SERPL IA-ACNC: 5 UNITS (ref 0–19)
CENTROMERE B AB SER-ACNC: <0.2 AI (ref 0–0.9)
CHLORIDE SERPL-SCNC: 101 MMOL/L (ref 96–106)
CHROMATIN AB SERPL-ACNC: <0.2 AI (ref 0–0.9)
CK SERPL-CCNC: 74 U/L (ref 32–182)
CO2 SERPL-SCNC: 19 MMOL/L (ref 20–29)
COLOR UR: YELLOW
CREAT SERPL-MCNC: 0.89 MG/DL (ref 0.57–1)
CRP SERPL-MCNC: 4 MG/L (ref 0–10)
DSDNA AB SER-ACNC: 5 IU/ML (ref 0–9)
EGFRCR SERPLBLD CKD-EPI 2021: 88 ML/MIN/1.73
ENA JO1 AB SER-ACNC: <0.2 AI (ref 0–0.9)
ENA RNP AB SER-ACNC: 0.9 AI (ref 0–0.9)
ENA SCL70 AB SER-ACNC: <0.2 AI (ref 0–0.9)
ENA SM AB SER-ACNC: <0.2 AI (ref 0–0.9)
ENA SS-A AB SER-ACNC: <0.2 AI (ref 0–0.9)
ENA SS-B AB SER-ACNC: <0.2 AI (ref 0–0.9)
ERYTHROCYTE [SEDIMENTATION RATE] IN BLOOD BY WESTERGREN METHOD: 9 MM/HR (ref 0–32)
GLOBULIN SER CALC-MCNC: 2.9 G/DL (ref 1.5–4.5)
GLUCOSE SERPL-MCNC: 82 MG/DL (ref 70–99)
GLUCOSE UR QL STRIP: NEGATIVE
HAV IGM SERPL QL IA: NEGATIVE
HBV CORE IGM SERPL QL IA: NEGATIVE
HBV SURFACE AG SERPL QL IA: NEGATIVE
HCV AB SERPL QL IA: NORMAL
HCV IGG SERPL QL IA: NON REACTIVE
HGB UR QL STRIP: NEGATIVE
KETONES UR QL STRIP: NEGATIVE
LEUKOCYTE ESTERASE UR QL STRIP: ABNORMAL
Lab: NORMAL
MITOCHONDRIA M2 IGG SER-ACNC: <20 UNITS (ref 0–20)
NITRITE UR QL STRIP: NEGATIVE
PH UR STRIP: 7 [PH] (ref 5–7.5)
POTASSIUM SERPL-SCNC: 4.4 MMOL/L (ref 3.5–5.2)
PROT SERPL-MCNC: 7.4 G/DL (ref 6–8.5)
PROT UR QL STRIP: NEGATIVE
RHEUMATOID FACT SERPL-ACNC: <10 IU/ML
SODIUM SERPL-SCNC: 139 MMOL/L (ref 134–144)
SP GR UR STRIP: 1.01 (ref 1–1.03)
SPECIMEN STATUS REPORT: NORMAL
UROBILINOGEN UR STRIP-MCNC: 0.2 MG/DL (ref 0.2–1)

## 2023-04-27 RX ORDER — DOXYCYCLINE 100 MG/1
100 CAPSULE ORAL 2 TIMES DAILY
Qty: 14 CAPSULE | Refills: 0 | Status: SHIPPED | OUTPATIENT
Start: 2023-04-27 | End: 2023-05-04

## 2023-04-28 ENCOUNTER — OFFICE VISIT (OUTPATIENT)
Age: 33
End: 2023-04-28
Payer: MEDICAID

## 2023-04-28 ENCOUNTER — HOSPITAL ENCOUNTER (OUTPATIENT)
Facility: HOSPITAL | Age: 33
Discharge: HOME | End: 2023-04-28
Payer: MEDICAID

## 2023-04-28 VITALS
BODY MASS INDEX: 32.82 KG/M2 | SYSTOLIC BLOOD PRESSURE: 110 MMHG | DIASTOLIC BLOOD PRESSURE: 78 MMHG | HEART RATE: 74 BPM | HEIGHT: 65 IN | WEIGHT: 197 LBS

## 2023-04-28 DIAGNOSIS — R55 SYNCOPE, UNSPECIFIED SYNCOPE TYPE: ICD-10-CM

## 2023-04-28 DIAGNOSIS — I10 HYPERTENSION, UNSPECIFIED TYPE: ICD-10-CM

## 2023-04-28 DIAGNOSIS — E04.1 THYROID NODULE: ICD-10-CM

## 2023-04-28 DIAGNOSIS — R94.31 ABNORMAL EKG: Primary | ICD-10-CM

## 2023-04-28 DIAGNOSIS — R07.9 CHEST PAIN, UNSPECIFIED TYPE: ICD-10-CM

## 2023-04-28 PROCEDURE — 93000 ELECTROCARDIOGRAM COMPLETE: CPT | Performed by: INTERNAL MEDICINE

## 2023-04-28 PROCEDURE — 76536 US EXAM OF HEAD AND NECK: CPT

## 2023-04-28 PROCEDURE — 99204 OFFICE O/P NEW MOD 45 MIN: CPT | Performed by: INTERNAL MEDICINE

## 2023-04-28 PROCEDURE — 3074F SYST BP LT 130 MM HG: CPT | Performed by: INTERNAL MEDICINE

## 2023-04-28 PROCEDURE — 3078F DIAST BP <80 MM HG: CPT | Performed by: INTERNAL MEDICINE

## 2023-04-28 NOTE — PROGRESS NOTES
Kamaljit Beatty presents today for   Chief Complaint   Patient presents with    New Patient     Est care previously seen  6.23.16. Referred by pcp due to abn EKG and HTN     Chest Pain     Center chest pressure/tightness on/off    Palpitations     Racing,fluttering,skipping and pounding palps on/off    Dizziness     Dizzy spells on/off    Edema     Bilateral feet/ankle swelling on/off       Kamaljit Beatty preferred language for health care discussion is english/other. Is someone accompanying this pt? NO    Is the patient using any DME equipment during OV? NO    Depression Screening:  Depression: Not at risk    PHQ-2 Score: 0        Learning Assessment:  No question data found. Pt currently taking Anticoagulant therapy? ELIQUIS 5 MG 2X DAILY     Pt currently taking Antiplatelet therapy ? NO      Coordination of Care:  1. Have you been to the ER, urgent care clinic since your last visit? Hospitalized since your last visit? NO    2. Have you seen or consulted any other health care providers outside of the [address] [place] [address] since your last visit? Include any pap smears or colon screening.  NO  NO
Smoking status: Never    Smokeless tobacco: Never   Substance and Sexual Activity    Alcohol use: Yes    Drug use: No    Sexual activity: Not on file   Other Topics Concern    Not on file   Social History Narrative    Not on file     Social Determinants of Health     Financial Resource Strain: Low Risk     Difficulty of Paying Living Expenses: Not hard at all   Food Insecurity: No Food Insecurity    Worried About [address] in the Last Year: Never true    [address] in the Last Year: Never true   Transportation Needs: Unknown    Lack of Transportation (Medical): Not on file    Lack of Transportation (Non-Medical): No   Physical Activity: Not on file   Stress: Not on file   Social Connections: Not on file   Intimate Partner Violence: Not on file   Housing Stability: Unknown    Unable to Pay for Housing in the Last Year: Not on file    Number of Places Lived in the Last Year: Not on file    Unstable Housing in the Last Year: No        Review of Systems    14 pt Review of Systems is negative unless otherwise mentioned in the HPI. Wt Readings from Last 3 Encounters:   04/28/23 197 lb (89.4 kg)   04/20/23 195 lb (88.5 kg)   04/10/23 195 lb 11.2 oz (88.8 kg)     Temp Readings from Last 3 Encounters:   04/20/23 98.5 °F (36.9 °C) (Temporal)   04/10/23 97.3 °F (36.3 °C)   04/05/23 97.1 °F (36.2 °C) (Temporal)     BP Readings from Last 3 Encounters:   04/28/23 110/78   04/20/23 133/87   04/10/23 124/88     Pulse Readings from Last 3 Encounters:   04/28/23 74   04/20/23 85   04/10/23 97     Physical Exam:    /78 (Site: Left Upper Arm, Position: Sitting, Cuff Size: Large Adult)   Pulse 74   Ht 5' 5\" (1.651 m)   Wt 197 lb (89.4 kg)   LMP  (LMP Unknown)   BMI 32.78 kg/m²    Physical Exam  Vitals and nursing note reviewed. Constitutional:       General: She is not in acute distress. Appearance: Normal appearance. HENT:      Head: Normocephalic.       Nose: Nose normal.      Mouth/Throat:

## 2023-05-10 ENCOUNTER — OFFICE VISIT (OUTPATIENT)
Age: 33
End: 2023-05-10
Payer: MEDICAID

## 2023-05-10 VITALS
WEIGHT: 196.8 LBS | DIASTOLIC BLOOD PRESSURE: 81 MMHG | OXYGEN SATURATION: 100 % | TEMPERATURE: 97.8 F | BODY MASS INDEX: 32.79 KG/M2 | HEART RATE: 77 BPM | SYSTOLIC BLOOD PRESSURE: 125 MMHG | HEIGHT: 65 IN

## 2023-05-10 DIAGNOSIS — M43.16 SPONDYLOLISTHESIS, LUMBAR REGION: ICD-10-CM

## 2023-05-10 DIAGNOSIS — M43.10 PARS DEFECT WITH SPONDYLOLISTHESIS: Primary | ICD-10-CM

## 2023-05-10 DIAGNOSIS — M51.36 DDD (DEGENERATIVE DISC DISEASE), LUMBAR: ICD-10-CM

## 2023-05-10 DIAGNOSIS — Q76.2 CONGENITAL SPONDYLOLISTHESIS: ICD-10-CM

## 2023-05-10 DIAGNOSIS — M47.816 SPONDYLOSIS WITHOUT MYELOPATHY OR RADICULOPATHY, LUMBAR REGION: ICD-10-CM

## 2023-05-10 DIAGNOSIS — M54.16 LUMBAR NEURITIS: ICD-10-CM

## 2023-05-10 PROCEDURE — 99214 OFFICE O/P EST MOD 30 MIN: CPT | Performed by: PHYSICAL MEDICINE & REHABILITATION

## 2023-05-10 PROCEDURE — 3079F DIAST BP 80-89 MM HG: CPT | Performed by: PHYSICAL MEDICINE & REHABILITATION

## 2023-05-10 PROCEDURE — 3074F SYST BP LT 130 MM HG: CPT | Performed by: PHYSICAL MEDICINE & REHABILITATION

## 2023-05-10 RX ORDER — METHYLPREDNISOLONE 4 MG/1
TABLET ORAL
Qty: 1 KIT | Refills: 0 | Status: SHIPPED | OUTPATIENT
Start: 2023-05-10

## 2023-05-10 NOTE — PROGRESS NOTES
[facility]  [address]  [address], [address]   Phone: [phone]  Fax: [phone]        PROGRESS NOTE      HISTORY OF PRESENT ILLNESS:  The patient is a 34 y.o. female and was seen today for follow up of progressive low back pain (sporadic) to the BLE(L=R) in a S1 distribution to just below the knees x 2006 w/o injury (low back pain >>BLE pain). Her pain is exacerbated by no position. Pt denies change in bowel or bladder habits. Patient denies recent fevers, weight loss, rashes or skin sores. No bleeding disorders. Patient is unsure why she got the peptic ulcer. No history of spinal surgery or injections. Patient denies recent physical therapy or chiropractic care. Patient saw a chiropractor 10 years ago with some relief with traction. Pt denies DM. Patient reports that to her knowledge her kidneys function properly. Patient says she has never done the medical cannabis and she has it for anxiety and low back pain. Patient got oxycodone from Shannan Rodrigez MD, plastic surgeon for skin removal on her LUE. Patient takes Eliquis. Patient has taken Flexeril for her pain. The patient is RHD. PmHx of Gastric Bypass (2016 and lost 150-180 lbs), uncontrolled HTN, RLE DVT, PTSD, depression, peptic ulcer disease (2019). Note from Marine Cardenas NP dated 3/14/2023 indicating patient was seen with c/o blood pressure not well controlled. Lumbar spine plain films dated 3/29/2023. 2 views: AP and Lateral. revealed: Grade 1 anterolisthesis L4 on L5. 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 Disc space collapse L4-5. Sacralization of L5. Mild disc space narrowing L3-4. At her last clinical appointment, I referred her to physical therapy with an emphasis on HEP. MDP deferred due to uncontrolled HTN. Patient was not interested in surgery or injection at this time. The patient returns today with pain location and distribution remains unchanged.  She rates her pain 8/10, previously

## 2023-05-18 ENCOUNTER — HOSPITAL ENCOUNTER (OUTPATIENT)
Facility: HOSPITAL | Age: 33
Discharge: HOME | End: 2023-05-21

## 2023-05-18 VITALS — BODY MASS INDEX: 32.15 KG/M2 | WEIGHT: 193 LBS | HEIGHT: 65 IN

## 2023-07-05 DIAGNOSIS — E87.6 HYPOKALEMIA: Primary | ICD-10-CM

## 2023-07-14 ENCOUNTER — OFFICE VISIT (OUTPATIENT)
Age: 33
End: 2023-07-14
Payer: MEDICAID

## 2023-07-14 VITALS — HEART RATE: 84 BPM | OXYGEN SATURATION: 100 % | TEMPERATURE: 97.7 F | WEIGHT: 194 LBS | BODY MASS INDEX: 32.28 KG/M2

## 2023-07-14 DIAGNOSIS — M47.817 LUMBOSACRAL SPONDYLOSIS WITHOUT MYELOPATHY: ICD-10-CM

## 2023-07-14 DIAGNOSIS — M43.10 PARS DEFECT WITH SPONDYLOLISTHESIS: Primary | ICD-10-CM

## 2023-07-14 DIAGNOSIS — M43.16 SPONDYLOLISTHESIS, LUMBAR REGION: ICD-10-CM

## 2023-07-14 DIAGNOSIS — M54.16 LUMBAR NEURITIS: ICD-10-CM

## 2023-07-14 DIAGNOSIS — M51.36 DDD (DEGENERATIVE DISC DISEASE), LUMBAR: ICD-10-CM

## 2023-07-14 PROCEDURE — 99214 OFFICE O/P EST MOD 30 MIN: CPT | Performed by: PHYSICAL MEDICINE & REHABILITATION

## 2023-07-14 RX ORDER — HYDROXYZINE PAMOATE 50 MG/1
CAPSULE ORAL
COMMUNITY
Start: 2023-06-09

## 2023-07-14 RX ORDER — DEXTROAMPHETAMINE SACCHARATE, AMPHETAMINE ASPARTATE, DEXTROAMPHETAMINE SULFATE AND AMPHETAMINE SULFATE 2.5; 2.5; 2.5; 2.5 MG/1; MG/1; MG/1; MG/1
TABLET ORAL
COMMUNITY
Start: 2023-06-18

## 2023-07-14 RX ORDER — DEXTROAMPHETAMINE SULFATE, DEXTROAMPHETAMINE SACCHARATE, AMPHETAMINE SULFATE AND AMPHETAMINE ASPARTATE 7.5; 7.5; 7.5; 7.5 MG/1; MG/1; MG/1; MG/1
CAPSULE, EXTENDED RELEASE ORAL
COMMUNITY
Start: 2023-06-18

## 2023-07-14 RX ORDER — TOPIRAMATE 25 MG/1
75 TABLET ORAL NIGHTLY
Qty: 90 TABLET | Refills: 1 | Status: SHIPPED | OUTPATIENT
Start: 2023-07-14

## 2023-07-14 NOTE — PROGRESS NOTES
needed per sleep study    JESSICA (stress urinary incontinence, female)        Social History     Socioeconomic History    Marital status:      Spouse name: None    Number of children: None    Years of education: None    Highest education level: None   Tobacco Use    Smoking status: Never    Smokeless tobacco: Never   Substance and Sexual Activity    Alcohol use: Yes    Drug use: No     Social Determinants of Health     Financial Resource Strain: Low Risk     Difficulty of Paying Living Expenses: Not hard at all   Food Insecurity: No Food Insecurity    Worried About [place] in the Last Year: Never true    Ran Out of Food in the Last Year: Never true   Transportation Needs: Unknown    Lack of Transportation (Non-Medical): No   Housing Stability: Unknown    Unstable Housing in the Last Year: No       Current Outpatient Medications   Medication Sig Dispense Refill    ADDERALL XR 30 MG capsule TAKE 1 CAPSULE BY MOUTH EVERY DAY IN THE MORNING      POTASSIUM PO Take by mouth daily OTC      losartan-hydroCHLOROthiazide (HYZAAR) 50-12.5 MG per tablet TAKE 1 TABLET BY MOUTH EVERY DAY 90 tablet 0    magnesium 30 MG tablet Take 1 tablet by mouth 2 times daily      albuterol sulfate HFA (PROVENTIL;VENTOLIN;PROAIR) 108 (90 Base) MCG/ACT inhaler Inhale 1-2 puffs into the lungs in the morning and 1-2 puffs at noon and 1-2 puffs in the evening and 1-2 puffs before bedtime. vitamin D (CHOLECALCIFEROL) 25 MCG (1000 UT) TABS tablet Take 1 tablet by mouth daily      metFORMIN (GLUCOPHAGE) 500 MG tablet TAKE 1 TABLET BY MOUTH TWO (2) TIMES DAILY (WITH MEALS).  FOR DIABETES      apixaban (ELIQUIS) 5 MG TABS tablet Take 0.5 tablets by mouth 2 times daily      ARIPiprazole (ABILIFY) 5 MG tablet 1 tablet daily      busPIRone (BUSPAR) 7.5 MG tablet Take 1 tablet by mouth 2 times daily      cyclobenzaprine (FLEXERIL) 10 MG tablet Take 1 tablet by mouth      hydrOXYzine HCl (ATARAX) 50 MG tablet TAKE 1 TABLET BY MOUTH UP TO

## 2023-07-17 DIAGNOSIS — M43.10 PARS DEFECT WITH SPONDYLOLISTHESIS: ICD-10-CM

## 2023-07-17 DIAGNOSIS — M51.36 DDD (DEGENERATIVE DISC DISEASE), LUMBAR: ICD-10-CM

## 2023-07-17 DIAGNOSIS — M47.817 LUMBOSACRAL SPONDYLOSIS WITHOUT MYELOPATHY: ICD-10-CM

## 2023-07-17 DIAGNOSIS — M43.16 SPONDYLOLISTHESIS, LUMBAR REGION: ICD-10-CM

## 2023-07-17 DIAGNOSIS — M54.16 LUMBAR NEURITIS: ICD-10-CM

## 2023-07-23 DIAGNOSIS — I10 UNCONTROLLED HYPERTENSION: ICD-10-CM

## 2023-07-24 DIAGNOSIS — E03.9 HYPOTHYROIDISM, UNSPECIFIED: ICD-10-CM

## 2023-07-24 RX ORDER — LOSARTAN POTASSIUM AND HYDROCHLOROTHIAZIDE 12.5; 5 MG/1; MG/1
TABLET ORAL
Qty: 33 TABLET | Refills: 0 | Status: SHIPPED | OUTPATIENT
Start: 2023-07-24

## 2023-07-24 RX ORDER — LEVOTHYROXINE SODIUM 0.07 MG/1
TABLET ORAL
Qty: 29 TABLET | Refills: 1 | Status: SHIPPED | OUTPATIENT
Start: 2023-07-24

## 2023-08-04 ENCOUNTER — HOSPITAL ENCOUNTER (OUTPATIENT)
Age: 33
End: 2023-08-04
Payer: MEDICAID

## 2023-08-04 PROCEDURE — 72148 MRI LUMBAR SPINE W/O DYE: CPT

## 2023-08-10 NOTE — PROGRESS NOTES
obesity with body mass index of 40.0-49.9 (HCC)     Multiple lung nodules on CT 04/05/2023    Dr Mikey Clifford    PCOS (polycystic ovarian syndrome)     no medication    PTSD (post-traumatic stress disorder) 01/14/2022    PUD (peptic ulcer disease) 2019    S/P bariatric surgery 05/19/2016    5/3/16 - [facility] [place]     Sleep apnea 2012    mild, no CPAP needed per sleep study    JESSICA (stress urinary incontinence, female)        Social History     Socioeconomic History    Marital status: Legally      Spouse name: None    Number of children: None    Years of education: None    Highest education level: None   Tobacco Use    Smoking status: Never    Smokeless tobacco: Never   Substance and Sexual Activity    Alcohol use: Yes    Drug use: No     Social Determinants of Health     Financial Resource Strain: Low Risk     Difficulty of Paying Living Expenses: Not hard at all   Food Insecurity: No Food Insecurity    Worried About Running Out of Food in the Last Year: Never true    Ran Out of Food in the Last Year: Never true   Transportation Needs: Unknown    Lack of Transportation (Non-Medical): No   Housing Stability: Unknown    Unstable Housing in the Last Year: No       Current Outpatient Medications   Medication Sig Dispense Refill    losartan-hydroCHLOROthiazide (HYZAAR) 50-12.5 MG per tablet TAKE 1 TABLET BY MOUTH EVERY DAY 33 tablet 0    levothyroxine (SYNTHROID) 75 MCG tablet TAKE 1 TABLET BY MOUTH EVERY DAY BEFORE BREAKFAST 29 tablet 1    ADDERALL XR 30 MG capsule TAKE 1 CAPSULE BY MOUTH EVERY DAY IN THE MORNING      topiramate (TOPAMAX) 25 MG tablet Take 3 tablets by mouth nightly 90 tablet 1    POTASSIUM PO Take by mouth daily OTC      magnesium 30 MG tablet Take 1 tablet by mouth 2 times daily      albuterol sulfate HFA (PROVENTIL;VENTOLIN;PROAIR) 108 (90 Base) MCG/ACT inhaler Inhale 1-2 puffs into the lungs in the morning and 1-2 puffs at noon and 1-2 puffs in the evening and 1-2 puffs before bedtime.

## 2023-08-11 ENCOUNTER — HOSPITAL ENCOUNTER (OUTPATIENT)
Facility: HOSPITAL | Age: 33
Discharge: HOME | End: 2023-08-14

## 2023-08-11 ENCOUNTER — OFFICE VISIT (OUTPATIENT)
Age: 33
End: 2023-08-11
Payer: MEDICAID

## 2023-08-11 VITALS
HEIGHT: 65 IN | OXYGEN SATURATION: 100 % | TEMPERATURE: 97.1 F | BODY MASS INDEX: 30.66 KG/M2 | HEART RATE: 79 BPM | WEIGHT: 184 LBS

## 2023-08-11 DIAGNOSIS — M43.16 SPONDYLOLISTHESIS, LUMBAR REGION: ICD-10-CM

## 2023-08-11 DIAGNOSIS — M47.817 LUMBOSACRAL SPONDYLOSIS WITHOUT MYELOPATHY: ICD-10-CM

## 2023-08-11 DIAGNOSIS — M43.10 PARS DEFECT WITH SPONDYLOLISTHESIS: Primary | ICD-10-CM

## 2023-08-11 DIAGNOSIS — M47.819 FACET ARTHROPATHY: ICD-10-CM

## 2023-08-11 DIAGNOSIS — M54.16 LUMBAR NEURITIS: ICD-10-CM

## 2023-08-11 DIAGNOSIS — M48.07 FORAMINAL STENOSIS OF LUMBOSACRAL REGION: ICD-10-CM

## 2023-08-11 DIAGNOSIS — M51.36 DDD (DEGENERATIVE DISC DISEASE), LUMBAR: ICD-10-CM

## 2023-08-11 LAB — LABCORP SPECIMEN COLLECTION: NORMAL

## 2023-08-11 PROCEDURE — 99214 OFFICE O/P EST MOD 30 MIN: CPT | Performed by: PHYSICAL MEDICINE & REHABILITATION

## 2023-08-11 RX ORDER — TOPIRAMATE 25 MG/1
75 TABLET ORAL 2 TIMES DAILY
Qty: 180 TABLET | Refills: 1 | Status: SHIPPED | OUTPATIENT
Start: 2023-08-11

## 2023-08-12 LAB
BUN SERPL-MCNC: 20 MG/DL (ref 6–20)
BUN/CREAT SERPL: 20 (ref 9–23)
CALCIUM SERPL-MCNC: 9.9 MG/DL (ref 8.7–10.2)
CENTROMERE B AB SER-ACNC: <0.2 AI (ref 0–0.9)
CHLORIDE SERPL-SCNC: 101 MMOL/L (ref 96–106)
CHROMATIN AB SERPL-ACNC: <0.2 AI (ref 0–0.9)
CK SERPL-CCNC: 93 U/L (ref 32–182)
CO2 SERPL-SCNC: 24 MMOL/L (ref 20–29)
CREAT SERPL-MCNC: 1.02 MG/DL (ref 0.57–1)
CRP SERPL-MCNC: <1 MG/L (ref 0–10)
DSDNA AB SER-ACNC: 4 IU/ML (ref 0–9)
EGFRCR SERPLBLD CKD-EPI 2021: 74 ML/MIN/1.73
ENA JO1 AB SER-ACNC: <0.2 AI (ref 0–0.9)
ENA RNP AB SER-ACNC: 0.4 AI (ref 0–0.9)
ENA SCL70 AB SER-ACNC: <0.2 AI (ref 0–0.9)
ENA SM AB SER-ACNC: <0.2 AI (ref 0–0.9)
ENA SS-A AB SER-ACNC: <0.2 AI (ref 0–0.9)
ENA SS-B AB SER-ACNC: <0.2 AI (ref 0–0.9)
ERYTHROCYTE [SEDIMENTATION RATE] IN BLOOD BY WESTERGREN METHOD: 8 MM/HR (ref 0–32)
GLUCOSE SERPL-MCNC: 96 MG/DL (ref 70–99)
HAV IGM SERPL QL IA: NEGATIVE
HBV CORE IGM SERPL QL IA: NEGATIVE
HBV SURFACE AG SERPL QL IA: NEGATIVE
HCV AB SERPL QL IA: NORMAL
HCV IGG SERPL QL IA: NON REACTIVE
Lab: NORMAL
POTASSIUM SERPL-SCNC: 4 MMOL/L (ref 3.5–5.2)
SODIUM SERPL-SCNC: 141 MMOL/L (ref 134–144)

## 2023-08-13 LAB — MITOCHONDRIA M2 IGG SER-ACNC: <20 UNITS (ref 0–20)

## 2023-08-22 ENCOUNTER — OFFICE VISIT (OUTPATIENT)
Age: 33
End: 2023-08-22
Payer: MEDICAID

## 2023-08-22 VITALS
WEIGHT: 181 LBS | OXYGEN SATURATION: 100 % | DIASTOLIC BLOOD PRESSURE: 84 MMHG | SYSTOLIC BLOOD PRESSURE: 121 MMHG | BODY MASS INDEX: 30.16 KG/M2 | HEART RATE: 82 BPM | RESPIRATION RATE: 16 BRPM | HEIGHT: 65 IN | TEMPERATURE: 97.8 F

## 2023-08-22 DIAGNOSIS — I10 UNCONTROLLED HYPERTENSION: Primary | ICD-10-CM

## 2023-08-22 DIAGNOSIS — I73.00 RAYNAUD'S PHENOMENON WITHOUT GANGRENE: ICD-10-CM

## 2023-08-22 DIAGNOSIS — E78.3 MIXED HYPERGLYCERIDEMIA: ICD-10-CM

## 2023-08-22 DIAGNOSIS — R73.03 PREDIABETES: ICD-10-CM

## 2023-08-22 DIAGNOSIS — E03.9 HYPOTHYROIDISM, UNSPECIFIED TYPE: ICD-10-CM

## 2023-08-22 DIAGNOSIS — L81.9 DISCOLORATION OF SKIN OF LOWER LEG: ICD-10-CM

## 2023-08-22 PROBLEM — R63.5 WEIGHT GAIN: Status: RESOLVED | Noted: 2023-01-06 | Resolved: 2023-08-22

## 2023-08-22 PROBLEM — Z86.32 HX GESTATIONAL DIABETES: Status: RESOLVED | Noted: 2022-01-14 | Resolved: 2023-08-22

## 2023-08-22 PROBLEM — M62.830 MUSCLE SPASM OF BACK: Status: RESOLVED | Noted: 2022-05-20 | Resolved: 2023-08-22

## 2023-08-22 PROBLEM — U07.1 COVID-19 VIRUS INFECTION: Status: RESOLVED | Noted: 2022-01-14 | Resolved: 2023-08-22

## 2023-08-22 PROBLEM — N93.9 VAGINAL BLEEDING: Status: RESOLVED | Noted: 2019-10-25 | Resolved: 2023-08-22

## 2023-08-22 PROBLEM — R94.31 ABNORMAL EKG: Status: RESOLVED | Noted: 2023-02-21 | Resolved: 2023-08-22

## 2023-08-22 PROBLEM — L08.9 LOCALIZED BACTERIAL SKIN INFECTION: Status: RESOLVED | Noted: 2023-04-05 | Resolved: 2023-08-22

## 2023-08-22 PROBLEM — R23.0 CYANOSIS: Status: RESOLVED | Noted: 2023-04-20 | Resolved: 2023-08-22

## 2023-08-22 PROBLEM — B96.89 LOCALIZED BACTERIAL SKIN INFECTION: Status: RESOLVED | Noted: 2023-04-05 | Resolved: 2023-08-22

## 2023-08-22 PROCEDURE — 3079F DIAST BP 80-89 MM HG: CPT | Performed by: NURSE PRACTITIONER

## 2023-08-22 PROCEDURE — 3074F SYST BP LT 130 MM HG: CPT | Performed by: NURSE PRACTITIONER

## 2023-08-22 PROCEDURE — 99211 OFF/OP EST MAY X REQ PHY/QHP: CPT | Performed by: NURSE PRACTITIONER

## 2023-08-22 PROCEDURE — 99213 OFFICE O/P EST LOW 20 MIN: CPT | Performed by: NURSE PRACTITIONER

## 2023-08-22 RX ORDER — LEVOTHYROXINE SODIUM 0.07 MG/1
75 TABLET ORAL
Qty: 90 TABLET | Refills: 1 | Status: SHIPPED | OUTPATIENT
Start: 2023-08-22

## 2023-08-22 RX ORDER — LOSARTAN POTASSIUM AND HYDROCHLOROTHIAZIDE 12.5; 5 MG/1; MG/1
1 TABLET ORAL DAILY
Qty: 90 TABLET | Refills: 1 | Status: SHIPPED | OUTPATIENT
Start: 2023-08-22

## 2023-08-22 ASSESSMENT — PATIENT HEALTH QUESTIONNAIRE - PHQ9
1. LITTLE INTEREST OR PLEASURE IN DOING THINGS: 1
SUM OF ALL RESPONSES TO PHQ QUESTIONS 1-9: 1
SUM OF ALL RESPONSES TO PHQ QUESTIONS 1-9: 1
SUM OF ALL RESPONSES TO PHQ9 QUESTIONS 1 & 2: 1
2. FEELING DOWN, DEPRESSED OR HOPELESS: 0
SUM OF ALL RESPONSES TO PHQ QUESTIONS 1-9: 1
SUM OF ALL RESPONSES TO PHQ QUESTIONS 1-9: 1

## 2023-08-22 NOTE — PROGRESS NOTES
Kyle Wilcox presents today for   Chief Complaint   Patient presents with    Hypertension    Follow-up       1. \"Have you been to the ER, urgent care clinic since your last visit? Hospitalized since your last visit? \" Yes, ER last week for infection in arm/breast/abdomen    2. \"Have you seen or consulted any other health care providers outside of the [address] [place] [address] since your last visit? \" no     3. For patients aged 43-73: Has the patient had a colonoscopy / FIT/ Cologuard? NA - based on age      If the patient is female:    4. For patients aged 43-66: Has the patient had a mammogram within the past 2 years? NA - based on age or sex      11. For patients aged 21-65: Has the patient had a pap smear?  Yes - no Care Gap present

## 2023-08-22 NOTE — PROGRESS NOTES
Internists of [address] [place] [address] [address]  [phone] [facility]/[phone] fax    8/22/2023    Anastacio Curran 1990 is a pleasant White (non-) female. Todays concern/HPI:  Chronic back pain. Dr Karina Mckee. Started Topamax. Flexeril as needed. Contemplating injection therapy. Only other treatment is surgery. Topamax is working. Rarely needing flexeril. Discoloration to lower extremities and hands. Occurs multiple times per day everyday. Will experience numbness and tingling at times. Extremities will get cold at times. Cellulitis chest wall. Couldn't move her left arm. Redness and inflammation on left side and breast that spread to Rt side of chest. Seen in ED. Came on fast. Cause unknown. 4 times this has occurred in the past. Rt axilla small area opened and then drained. Area not draining now. Dr Edvin Larson did reconstructive surgery in Jan. Pt to f/u with surgeon. Review of Systems - Negative except above    Past Medical History:   Diagnosis Date    Acute deep vein thrombosis (DVT) of right peroneal vein ([address]) 10/13/2022    Dr Antonette Quarles    Adverse effect of anesthesia     \"Took a little bit longer to wake up and longer to put me out\"    Alpha thalassemia ([address])     Dx 1/2015 during pregnancy    Arthritis 2017    Asthma     Calculus of kidney 2017    COVID-19 virus infection 01/14/2022    Depression 2007    DVT (deep venous thrombosis) ([address]) 10/2022    after surgery    Dyslipidemia 01/14/2022    Fatty liver disease, nonalcoholic     confirmed via US    GERD (gastroesophageal reflux disease)     resolved    Hidradenitis suppurativa     History of miscarriage     5 total as of 2016    Hx gestational diabetes 01/14/2022    Hypercholesterolemia     resolved    Hypertension     resolved    Hypokalemia 04/05/2023    Hypothyroid     Iron deficiency anemia 09/27/2017    Secondary to gastric bypass.  Dr Esthela Watt, hx iron infusion    Morbid

## 2023-09-06 ENCOUNTER — TELEPHONE (OUTPATIENT)
Age: 33
End: 2023-09-06

## 2023-09-06 NOTE — TELEPHONE ENCOUNTER
----- Message from Kyle Wilcox sent at 9/5/2023  3:23 PM EDT -----  Regarding: Medical clearance letter  Contact: [phone]  Good afternoon I am scheduled for a tubal reversal surgery September 18th and my dr is requesting a cardiology clearance letter due to my history. Is there any way this could be sent over to me? chest pain

## 2023-09-11 ENCOUNTER — HOSPITAL ENCOUNTER (OUTPATIENT)
Facility: HOSPITAL | Age: 33
Discharge: HOME | End: 2023-09-14
Payer: MEDICAID

## 2023-09-11 ENCOUNTER — HOSPITAL ENCOUNTER (OUTPATIENT)
Facility: HOSPITAL | Age: 33
Discharge: HOME | End: 2023-09-14

## 2023-09-11 DIAGNOSIS — Z01.818 PREOPERATIVE CLEARANCE: ICD-10-CM

## 2023-09-11 LAB
EKG ATRIAL RATE: 83 BPM
EKG DIAGNOSIS: NORMAL
EKG P AXIS: 59 DEGREES
EKG P-R INTERVAL: 132 MS
EKG Q-T INTERVAL: 366 MS
EKG QRS DURATION: 78 MS
EKG QTC CALCULATION (BAZETT): 430 MS
EKG R AXIS: 47 DEGREES
EKG T AXIS: 60 DEGREES
EKG VENTRICULAR RATE: 83 BPM
LABCORP SPECIMEN COLLECTION: NORMAL

## 2023-09-11 PROCEDURE — 93010 ELECTROCARDIOGRAM REPORT: CPT | Performed by: INTERNAL MEDICINE

## 2023-09-11 PROCEDURE — 71046 X-RAY EXAM CHEST 2 VIEWS: CPT

## 2023-09-11 PROCEDURE — 93005 ELECTROCARDIOGRAM TRACING: CPT | Performed by: NURSE PRACTITIONER

## 2023-09-12 ENCOUNTER — TELEPHONE (OUTPATIENT)
Age: 33
End: 2023-09-12

## 2023-09-12 ENCOUNTER — OFFICE VISIT (OUTPATIENT)
Age: 33
End: 2023-09-12
Payer: MEDICAID

## 2023-09-12 VITALS
TEMPERATURE: 98.1 F | RESPIRATION RATE: 18 BRPM | SYSTOLIC BLOOD PRESSURE: 114 MMHG | DIASTOLIC BLOOD PRESSURE: 80 MMHG | OXYGEN SATURATION: 100 % | WEIGHT: 173.13 LBS | BODY MASS INDEX: 28.84 KG/M2 | HEART RATE: 80 BPM | HEIGHT: 65 IN

## 2023-09-12 DIAGNOSIS — Z01.818 PREOPERATIVE CLEARANCE: Primary | ICD-10-CM

## 2023-09-12 DIAGNOSIS — Z31.0 REVERSAL OF STERILIZATION: ICD-10-CM

## 2023-09-12 PROBLEM — Z31.42: Status: RESOLVED | Noted: 2023-09-12 | Resolved: 2023-09-12

## 2023-09-12 PROBLEM — Z31.42: Status: ACTIVE | Noted: 2023-09-12

## 2023-09-12 LAB
ALBUMIN SERPL-MCNC: 4.6 G/DL (ref 3.9–4.9)
ALBUMIN/GLOB SERPL: 1.8 {RATIO} (ref 1.2–2.2)
ALP SERPL-CCNC: 62 IU/L (ref 44–121)
ALT SERPL-CCNC: 17 IU/L (ref 0–32)
AST SERPL-CCNC: 16 IU/L (ref 0–40)
BASOPHILS # BLD AUTO: 0 X10E3/UL (ref 0–0.2)
BASOPHILS NFR BLD AUTO: 1 %
BILIRUB SERPL-MCNC: 0.2 MG/DL (ref 0–1.2)
BUN SERPL-MCNC: 17 MG/DL (ref 6–20)
BUN/CREAT SERPL: 17 (ref 9–23)
CALCIUM SERPL-MCNC: 9.7 MG/DL (ref 8.7–10.2)
CHLORIDE SERPL-SCNC: 107 MMOL/L (ref 96–106)
CO2 SERPL-SCNC: 21 MMOL/L (ref 20–29)
CREAT SERPL-MCNC: 1.03 MG/DL (ref 0.57–1)
EGFRCR SERPLBLD CKD-EPI 2021: 73 ML/MIN/1.73
EOSINOPHIL # BLD AUTO: 0.1 X10E3/UL (ref 0–0.4)
EOSINOPHIL NFR BLD AUTO: 1 %
ERYTHROCYTE [DISTWIDTH] IN BLOOD BY AUTOMATED COUNT: 13.4 % (ref 11.7–15.4)
GLOBULIN SER CALC-MCNC: 2.6 G/DL (ref 1.5–4.5)
GLUCOSE SERPL-MCNC: 87 MG/DL (ref 70–99)
HCT VFR BLD AUTO: 44.6 % (ref 34–46.6)
HGB BLD-MCNC: 15 G/DL (ref 11.1–15.9)
IMM GRANULOCYTES # BLD AUTO: 0 X10E3/UL (ref 0–0.1)
IMM GRANULOCYTES NFR BLD AUTO: 0 %
LYMPHOCYTES # BLD AUTO: 2.8 X10E3/UL (ref 0.7–3.1)
LYMPHOCYTES NFR BLD AUTO: 38 %
MCH RBC QN AUTO: 29 PG (ref 26.6–33)
MCHC RBC AUTO-ENTMCNC: 33.6 G/DL (ref 31.5–35.7)
MCV RBC AUTO: 86 FL (ref 79–97)
MONOCYTES # BLD AUTO: 0.3 X10E3/UL (ref 0.1–0.9)
MONOCYTES NFR BLD AUTO: 4 %
NEUTROPHILS # BLD AUTO: 4.3 X10E3/UL (ref 1.4–7)
NEUTROPHILS NFR BLD AUTO: 56 %
PLATELET # BLD AUTO: 355 X10E3/UL (ref 150–450)
POTASSIUM SERPL-SCNC: 4 MMOL/L (ref 3.5–5.2)
PROT SERPL-MCNC: 7.2 G/DL (ref 6–8.5)
RBC # BLD AUTO: 5.17 X10E6/UL (ref 3.77–5.28)
SODIUM SERPL-SCNC: 143 MMOL/L (ref 134–144)
WBC # BLD AUTO: 7.6 X10E3/UL (ref 3.4–10.8)

## 2023-09-12 PROCEDURE — 3074F SYST BP LT 130 MM HG: CPT | Performed by: NURSE PRACTITIONER

## 2023-09-12 PROCEDURE — 3079F DIAST BP 80-89 MM HG: CPT | Performed by: NURSE PRACTITIONER

## 2023-09-12 PROCEDURE — 99211 OFF/OP EST MAY X REQ PHY/QHP: CPT

## 2023-09-12 PROCEDURE — 99214 OFFICE O/P EST MOD 30 MIN: CPT | Performed by: NURSE PRACTITIONER

## 2023-09-12 NOTE — TELEPHONE ENCOUNTER
----- Message from Tony Hawley sent at 9/12/2023  7:03 AM EDT -----  Regarding: Medical clearance letter  Contact: [phone]  Good morning, my doctor would now like the letter to state that my heart is healthy enough to go through with a pregnancy if it's possible to get the clearance letter to state that?
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
## 2023-09-21 ENCOUNTER — HOSPITAL ENCOUNTER (OUTPATIENT)
Facility: HOSPITAL | Age: 33
Discharge: HOME | End: 2023-09-24

## 2023-09-21 LAB — LABCORP SPECIMEN COLLECTION: NORMAL

## 2023-09-22 LAB
T4 FREE SERPL-MCNC: 1.47 NG/DL (ref 0.82–1.77)
TSH SERPL DL<=0.005 MIU/L-ACNC: 1.71 UIU/ML (ref 0.45–4.5)

## 2023-09-25 ENCOUNTER — TELEPHONE (OUTPATIENT)
Age: 33
End: 2023-09-25

## 2023-09-25 NOTE — TELEPHONE ENCOUNTER
----- Message from Ben Lane sent at 9/25/2023 10:47 AM EDT -----  Regarding: norethindrone  Contact: [phone]  Darion Mai I was just wondering if I could get a prescription for norethindrone to delay my period as it is supposed to hit directly on my honeymoon. Is this possible at all?

## 2023-10-12 DIAGNOSIS — M47.819 FACET ARTHROPATHY: ICD-10-CM

## 2023-10-12 DIAGNOSIS — M54.16 LUMBAR NEURITIS: ICD-10-CM

## 2023-10-12 DIAGNOSIS — M48.07 FORAMINAL STENOSIS OF LUMBOSACRAL REGION: ICD-10-CM

## 2023-10-12 DIAGNOSIS — M43.10 PARS DEFECT WITH SPONDYLOLISTHESIS: ICD-10-CM

## 2023-10-12 DIAGNOSIS — M43.16 SPONDYLOLISTHESIS, LUMBAR REGION: ICD-10-CM

## 2023-10-12 DIAGNOSIS — M51.36 DDD (DEGENERATIVE DISC DISEASE), LUMBAR: ICD-10-CM

## 2023-10-12 DIAGNOSIS — M47.817 LUMBOSACRAL SPONDYLOSIS WITHOUT MYELOPATHY: ICD-10-CM

## 2023-10-12 PROBLEM — Z01.818 PREOPERATIVE CLEARANCE: Status: RESOLVED | Noted: 2023-09-12 | Resolved: 2023-10-12

## 2023-10-12 RX ORDER — TOPIRAMATE 25 MG/1
TABLET ORAL
Qty: 180 TABLET | Refills: 1 | OUTPATIENT
Start: 2023-10-12

## 2023-10-13 ENCOUNTER — OFFICE VISIT (OUTPATIENT)
Age: 33
End: 2023-10-13
Payer: MEDICAID

## 2023-10-13 VITALS
BODY MASS INDEX: 27.29 KG/M2 | HEART RATE: 75 BPM | OXYGEN SATURATION: 99 % | WEIGHT: 164 LBS | TEMPERATURE: 97.6 F | RESPIRATION RATE: 19 BRPM

## 2023-10-13 DIAGNOSIS — M43.16 SPONDYLOLISTHESIS, LUMBAR REGION: ICD-10-CM

## 2023-10-13 DIAGNOSIS — M51.36 DDD (DEGENERATIVE DISC DISEASE), LUMBAR: ICD-10-CM

## 2023-10-13 DIAGNOSIS — M43.10 PARS DEFECT WITH SPONDYLOLISTHESIS: ICD-10-CM

## 2023-10-13 DIAGNOSIS — M47.819 FACET ARTHROPATHY: ICD-10-CM

## 2023-10-13 DIAGNOSIS — M48.07 FORAMINAL STENOSIS OF LUMBOSACRAL REGION: ICD-10-CM

## 2023-10-13 DIAGNOSIS — M54.16 LUMBAR NEURITIS: ICD-10-CM

## 2023-10-13 DIAGNOSIS — M47.817 LUMBOSACRAL SPONDYLOSIS WITHOUT MYELOPATHY: ICD-10-CM

## 2023-10-13 DIAGNOSIS — M43.10 PARS DEFECT WITH SPONDYLOLISTHESIS: Primary | ICD-10-CM

## 2023-10-13 PROCEDURE — 99214 OFFICE O/P EST MOD 30 MIN: CPT | Performed by: PHYSICAL MEDICINE & REHABILITATION

## 2023-10-13 RX ORDER — TOPIRAMATE 25 MG/1
TABLET ORAL
Qty: 180 TABLET | Refills: 1 | OUTPATIENT
Start: 2023-10-13

## 2023-10-13 NOTE — PROGRESS NOTES
[facility]  [address]  [address]  [place], [place]  Tel: [phone]  Fax: [phone]          PROGRESS NOTE      HISTORY OF PRESENT ILLNESS:  The patient is a 28 y.o. female and was seen today for follow up of progressive low back pain (sporadic) to the BLE(L=R) in a S1 distribution to just below the knees x 2006 w/o injury (low back pain>>BLE pain). Her pain is exacerbated by no position. Pt denies change in bowel or bladder habits. Patient denies recent fevers, weight loss, rashes or skin sores. No bleeding disorders. Patient is unsure why she got the peptic ulcer. No history of spinal surgery or injections. Patient denies recent physical therapy or chiropractic care. Patient saw a chiropractor 10 years ago with some relief with traction. Pt denies DM. Patient reports that to her knowledge her kidneys function properly. Patient says she has never done the medical cannabis and she has it for anxiety and low back pain. Patient got oxycodone from Kizzy Pérez MD, plastic surgeon for skin removal on her LUE. Patient takes Eliquis through Dr. Laura Valencia for her DVT. Patient has taken Flexeril for her pain. The patient is RHD. PmHx of Gastric Bypass (2016 and lost 150-180 lbs), HTN, RLE DVT (October 2022), PTSD, tubal ligation, depression, peptic ulcer disease (2019). Note from Oracio Boo NP dated 3/14/2023 indicating patient was seen with c/o blood pressure not well controlled. Lumbar spine plain films dated 3/29/2023. 2 views: AP and Lateral. revealed: Grade 1 anterolisthesis L4 on L5. 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 Disc space collapse L4-5. Sacralization of L5. Mild disc space narrowing L3-4. Lumbar spine MRI dated 7/17/2023 films independently reviewed. Per report, Multilevel disc and facet degenerative change with L5 spondylolysis and 8 mm anterolisthesis of L5 on S1. There is severe bilateral foraminal stenosis at L5-S1.  L4-L5: Minimal

## 2023-10-14 ENCOUNTER — PATIENT MESSAGE (OUTPATIENT)
Age: 33
End: 2023-10-14

## 2023-10-14 DIAGNOSIS — I10 UNCONTROLLED HYPERTENSION: Primary | ICD-10-CM

## 2023-10-16 RX ORDER — LABETALOL 100 MG/1
100 TABLET, FILM COATED ORAL 2 TIMES DAILY
Qty: 186 TABLET | Refills: 1 | Status: SHIPPED | OUTPATIENT
Start: 2023-10-16

## 2023-10-16 NOTE — TELEPHONE ENCOUNTER
From: Mariela Dc  To: Ana Paula White  Sent: 10/14/2023 6:04 AM EDT  Subject: Pregnancy safe medications    Good morning Dr Rosendo Chapman, I'm preparing to try to conceive now and overviewing all of my medications and my surgeon mentioned that the losartan may not be safe during pregnancy? Do I need to switch to something else? Are any of my other meds unsafe during pregnancy? Dr. Lara Wilson is already titrating me off of the nerve blocker.

## 2023-10-16 NOTE — TELEPHONE ENCOUNTER
DC hyzaar. Start Labetolol 100mg BID. Monitor BP at home 2 hours after taking BP meds. If BP elevated (>140/90) 3 consecutive days, call office with readings. Need to speak with psych and ortho about medications. Diagnosis Orders   1.  Uncontrolled hypertension  labetalol (NORMODYNE) 100 MG tablet

## 2023-10-26 ENCOUNTER — PATIENT MESSAGE (OUTPATIENT)
Age: 33
End: 2023-10-26

## 2023-11-02 ENCOUNTER — OFFICE VISIT (OUTPATIENT)
Age: 33
End: 2023-11-02
Payer: COMMERCIAL

## 2023-11-02 VITALS
HEART RATE: 69 BPM | RESPIRATION RATE: 18 BRPM | TEMPERATURE: 98.5 F | OXYGEN SATURATION: 100 % | WEIGHT: 165 LBS | BODY MASS INDEX: 27.46 KG/M2

## 2023-11-02 DIAGNOSIS — M54.50 LUMBAR PAIN: Primary | ICD-10-CM

## 2023-11-02 PROCEDURE — 99213 OFFICE O/P EST LOW 20 MIN: CPT | Performed by: NURSE PRACTITIONER

## 2023-11-02 PROCEDURE — 72100 X-RAY EXAM L-S SPINE 2/3 VWS: CPT | Performed by: NURSE PRACTITIONER

## 2023-11-02 NOTE — PROGRESS NOTES
Chief complaint   Chief Complaint   Patient presents with    Back Problem       History of Present Illness:  Kana Stokes is a  28 y.o.  female who comes in today after last seen Dr. Connie Sanderson on October 13, 2023. She states since she saw him she underwent a bilateral tubal ligation reversal in [place] on October 16, 2023. She states since that time she has had really bad low back pain without leg pain that is increased with movement and if she puts pressure on her left leg the pain will increase. She is describing it as a sharp stabbing pain just in that 1 area. She has tried taking ibuprofen and Tylenol without relief. She is off of the Topamax now because she plans to get pregnant once her  returns from deployment in January. She states she is currently not pregnant. She denies fever bowel bladder dysfunction. She denies any injury. Physical Exam: The patient is a 71-year-old female well-developed well-nourished who is alert and oriented with a normal mood and affect. She is a full weightbearing antalgic gait. She not using assist device. She has 5 out of 5 strength bilateral lower extremities. Negative straight leg raise pain. She is tender to palpation over the the left paraspinal area. Assessment and Plan: This is a patient is having increased back pain on the left since having her abdominal surgery 21st her tubal ligation. I put a paperclip over the point of her pain and did a lumbar x-ray. It shows it to be over the left L5-S1 facet. She is already scheduled to get bilateral L5-S1 facet blocks done on November 14, 2023. I think perhaps maybe some positioning during her surgery may have flared up that area. She can continue her ibuprofen and Tylenol for discomfort. She will have to stop the ibuprofen 5 days prior to the block. We will see her back with block.       XRAY: 11/02/23   body part: Lumbar  side (rt/lt): bilateral  number of views taken:2  Findings: As

## 2023-11-20 ENCOUNTER — TELEPHONE (OUTPATIENT)
Age: 33
End: 2023-11-20

## 2023-11-20 NOTE — TELEPHONE ENCOUNTER
----- Message from Murphy Luevano sent at 11/20/2023  3:13 PM EST -----  Regarding: Back injury/surgery   Contact: [phone]  Here are the results of the catscan

## 2023-11-20 NOTE — TELEPHONE ENCOUNTER
Bj Thomas has reviewed chart along with er notes. He would like for her to schedule a follow up. The  is working to get her scheduled.

## 2023-11-21 ENCOUNTER — TELEMEDICINE (OUTPATIENT)
Age: 33
End: 2023-11-21
Payer: COMMERCIAL

## 2023-11-21 DIAGNOSIS — M47.817 LUMBOSACRAL SPONDYLOSIS WITHOUT MYELOPATHY: ICD-10-CM

## 2023-11-21 DIAGNOSIS — M54.16 LUMBAR NEURITIS: ICD-10-CM

## 2023-11-21 DIAGNOSIS — M51.36 DDD (DEGENERATIVE DISC DISEASE), LUMBAR: ICD-10-CM

## 2023-11-21 DIAGNOSIS — S32.040A CLOSED WEDGE COMPRESSION FRACTURE OF L4 VERTEBRA, INITIAL ENCOUNTER (HCC): ICD-10-CM

## 2023-11-21 DIAGNOSIS — M43.10 PARS DEFECT WITH SPONDYLOLISTHESIS: ICD-10-CM

## 2023-11-21 DIAGNOSIS — S32.030A CLOSED WEDGE COMPRESSION FRACTURE OF L3 VERTEBRA, INITIAL ENCOUNTER (HCC): Primary | ICD-10-CM

## 2023-11-21 DIAGNOSIS — M47.819 FACET ARTHROPATHY: ICD-10-CM

## 2023-11-21 DIAGNOSIS — M48.07 FORAMINAL STENOSIS OF LUMBOSACRAL REGION: ICD-10-CM

## 2023-11-21 DIAGNOSIS — M43.16 SPONDYLOLISTHESIS, LUMBAR REGION: ICD-10-CM

## 2023-11-21 PROCEDURE — 99213 OFFICE O/P EST LOW 20 MIN: CPT | Performed by: PHYSICAL MEDICINE & REHABILITATION

## 2023-11-21 RX ORDER — HYDROCODONE BITARTRATE AND ACETAMINOPHEN 5; 325 MG/1; MG/1
1 TABLET ORAL 2 TIMES DAILY PRN
Qty: 60 TABLET | Refills: 0 | Status: SHIPPED | OUTPATIENT
Start: 2023-11-21 | End: 2023-12-21

## 2023-11-21 RX ORDER — DROSPIRENONE 4 MG/1
TABLET, FILM COATED ORAL
COMMUNITY

## 2023-11-21 NOTE — PROGRESS NOTES
OTC      magnesium 30 MG tablet Take 1 tablet by mouth 2 times daily      vitamin D (CHOLECALCIFEROL) 25 MCG (1000 UT) TABS tablet Take 1 tablet by mouth daily      apixaban (ELIQUIS) 5 MG TABS tablet Take 0.5 tablets by mouth 2 times daily      ARIPiprazole (ABILIFY) 5 MG tablet 1 tablet daily      busPIRone (BUSPAR) 7.5 MG tablet Take 1 tablet by mouth 2 times daily      hydrOXYzine HCl (ATARAX) 50 MG tablet TAKE 1 TABLET BY MOUTH UP TO TWICE A DAY AS NEEDED FOR ANXIETY      sertraline (ZOLOFT) 100 MG tablet Take 2 tablets by mouth daily       No current facility-administered medications for this visit. Allergies   Allergen Reactions    Penicillins Nausea And Vomiting, Rash, Shortness Of Breath and Swelling    Nsaids Other (See Comments)     D/T gastric bypass          PHYSICAL EXAMINATION  Deferred. CONSTITUTIONAL: NAD, A&O x 3    ASSESSMENT   VETO was seen today for back problem. Diagnoses and all orders for this visit:    Closed wedge compression fracture of L3 vertebra, initial encounter ([address])    Closed wedge compression fracture of L4 vertebra, initial encounter ([address])    Pars defect with spondylolisthesis    Spondylolisthesis, lumbar region    DDD (degenerative disc disease), lumbar    Lumbosacral spondylosis without myelopathy    Lumbar neuritis    Facet arthropathy    Foraminal stenosis of lumbosacral region        IMPRESSION AND PLAN:  The patient consented to the tele health visit and was aware that there would be a charge. During today's TeleVisit patient had c/o low back pain without radicular symptoms. Multiple treatment options were discussed. I discussed a kyphoplasty for her recent L3 and L4 compression fractures. I explained the nature of these injuries and how they heal naturally. Patient is not interested in a kyphoplasty at this time. I recommended she get a bone density test done when her compression fractures heal up.  I informed her that injections are not an option at this time

## 2023-11-24 ENCOUNTER — TELEPHONE (OUTPATIENT)
Age: 33
End: 2023-11-24

## 2023-11-24 NOTE — TELEPHONE ENCOUNTER
I spoke to MsWyatt Kasandra and informed her that we received a denial on the PA submitted. She is going to call the pharmacy to follow up and will call our office back if rx is unable to be paid for out of pocket. I also let her know that Brian Block is out of office, so if she is unable to get the rx from [facility] it will be Monday before he is able to get the norco sent to the new requested pharmacy.

## 2023-11-24 NOTE — TELEPHONE ENCOUNTER
----- Message from Ben Lane sent at 11/23/2023 12:14 PM EST -----  Regarding: Follow up questions on back break  Contact: [phone]  Could I please have the prescription sent to [facility] on [address] instead? [facility] apparently has a policy that I can't pay for narcotics out of pocket until insurance approves or denies and they're taking forever and I am just in excruciating pain.

## 2023-12-02 ENCOUNTER — PATIENT MESSAGE (OUTPATIENT)
Age: 33
End: 2023-12-02

## 2023-12-04 NOTE — TELEPHONE ENCOUNTER
Ok to pend up letter for Dr Jorge Curry to sign stating pt is currently on the norco for her recent L3 and L4 compression fractures. This medication helps to manage her acute pain.

## 2023-12-05 ENCOUNTER — HOSPITAL ENCOUNTER (OUTPATIENT)
Facility: HOSPITAL | Age: 33
Discharge: HOME | End: 2023-12-07
Payer: COMMERCIAL

## 2023-12-05 DIAGNOSIS — I82.451 DEEP VENOUS THROMBOSIS (DVT) OF RIGHT PERONEAL VEIN, UNSPECIFIED CHRONICITY (HCC): ICD-10-CM

## 2023-12-05 PROCEDURE — 93971 EXTREMITY STUDY: CPT

## 2023-12-05 PROCEDURE — 93971 EXTREMITY STUDY: CPT | Performed by: STUDENT IN AN ORGANIZED HEALTH CARE EDUCATION/TRAINING PROGRAM

## 2023-12-06 ENCOUNTER — OFFICE VISIT (OUTPATIENT)
Age: 33
End: 2023-12-06
Payer: COMMERCIAL

## 2023-12-06 VITALS
HEIGHT: 65 IN | SYSTOLIC BLOOD PRESSURE: 113 MMHG | HEART RATE: 75 BPM | BODY MASS INDEX: 27.32 KG/M2 | WEIGHT: 164 LBS | DIASTOLIC BLOOD PRESSURE: 74 MMHG | OXYGEN SATURATION: 98 % | RESPIRATION RATE: 18 BRPM | TEMPERATURE: 98.3 F

## 2023-12-06 DIAGNOSIS — S32.040A CLOSED WEDGE COMPRESSION FRACTURE OF L4 VERTEBRA, INITIAL ENCOUNTER (HCC): ICD-10-CM

## 2023-12-06 DIAGNOSIS — S32.030A CLOSED WEDGE COMPRESSION FRACTURE OF L3 VERTEBRA, INITIAL ENCOUNTER (HCC): Primary | ICD-10-CM

## 2023-12-06 PROCEDURE — 3074F SYST BP LT 130 MM HG: CPT | Performed by: NURSE PRACTITIONER

## 2023-12-06 PROCEDURE — 3078F DIAST BP <80 MM HG: CPT | Performed by: NURSE PRACTITIONER

## 2023-12-06 PROCEDURE — 99213 OFFICE O/P EST LOW 20 MIN: CPT | Performed by: NURSE PRACTITIONER

## 2023-12-06 RX ORDER — CALCIUM CARBONATE 500(1250)
500 TABLET ORAL DAILY
COMMUNITY

## 2023-12-06 RX ORDER — M-VIT,TX,IRON,MINS/CALC/FOLIC 27MG-0.4MG
1 TABLET ORAL DAILY
COMMUNITY

## 2023-12-06 RX ORDER — FERROUS SULFATE 325(65) MG
325 TABLET ORAL
COMMUNITY

## 2023-12-06 SDOH — ECONOMIC STABILITY: INCOME INSECURITY: HOW HARD IS IT FOR YOU TO PAY FOR THE VERY BASICS LIKE FOOD, HOUSING, MEDICAL CARE, AND HEATING?: NOT HARD AT ALL

## 2023-12-06 SDOH — ECONOMIC STABILITY: FOOD INSECURITY: WITHIN THE PAST 12 MONTHS, YOU WORRIED THAT YOUR FOOD WOULD RUN OUT BEFORE YOU GOT MONEY TO BUY MORE.: NEVER TRUE

## 2023-12-06 SDOH — ECONOMIC STABILITY: FOOD INSECURITY: WITHIN THE PAST 12 MONTHS, THE FOOD YOU BOUGHT JUST DIDN'T LAST AND YOU DIDN'T HAVE MONEY TO GET MORE.: NEVER TRUE

## 2023-12-06 ASSESSMENT — PATIENT HEALTH QUESTIONNAIRE - PHQ9
2. FEELING DOWN, DEPRESSED OR HOPELESS: 1
SUM OF ALL RESPONSES TO PHQ QUESTIONS 1-9: 2
1. LITTLE INTEREST OR PLEASURE IN DOING THINGS: 1
SUM OF ALL RESPONSES TO PHQ9 QUESTIONS 1 & 2: 2
SUM OF ALL RESPONSES TO PHQ QUESTIONS 1-9: 2

## 2023-12-06 NOTE — ASSESSMENT & PLAN NOTE
Referral placed to Dr Lito Kamara per pt request  Reviewed Dr Raciel Peña.  He recommends DEXA after pt heals

## 2024-01-09 DIAGNOSIS — S32.040A CLOSED WEDGE COMPRESSION FRACTURE OF L4 VERTEBRA, INITIAL ENCOUNTER (HCC): ICD-10-CM

## 2024-01-09 DIAGNOSIS — S32.030A CLOSED WEDGE COMPRESSION FRACTURE OF L3 VERTEBRA, INITIAL ENCOUNTER (HCC): Primary | ICD-10-CM

## 2024-01-09 DIAGNOSIS — M43.10 PARS DEFECT WITH SPONDYLOLISTHESIS: ICD-10-CM

## 2024-01-09 RX ORDER — HYDROCODONE BITARTRATE AND ACETAMINOPHEN 5; 325 MG/1; MG/1
1 TABLET ORAL 2 TIMES DAILY PRN
Qty: 30 TABLET | Refills: 0 | Status: SHIPPED | OUTPATIENT
Start: 2024-01-09 | End: 2024-02-08

## 2024-01-09 NOTE — TELEPHONE ENCOUNTER
I spoke to [facility] in [place] and was advised that they only received one prescription on 11/21 and that the patient only picked up that one prescription.

## 2024-01-09 NOTE — TELEPHONE ENCOUNTER
After chart review was done. On 's blue sheet her wrote a prescription for Norco , #60 , RF-1. I will call the pharmacy once they open to verify what the patient picked up and I will follow up with the patient afterwards.

## 2024-01-09 NOTE — TELEPHONE ENCOUNTER
Regarding: FW: Prescription refill  Contact: [phone]  So did we write for a refill on the original norco rx or not?  ----- Message -----  From: Shahrzad Perez RT  Sent: 1/8/2024  11:31 AM EST  To: Zen Tinoco MD  Subject: Prescription refill                              ----- Message from RT Justine sent at 1/8/2024 11:31 AM EST -----       ----- Message from Madai Slaughter to Zen Tinoco MD sent at 1/8/2024 10:05 AM -----   Hello I was trying to refill my pain medication over the holiday with the one refill allowed, in case I need it on my upcoming trip [place] but the pharmacy says there is not one in their system. Thank you!

## 2024-02-11 DIAGNOSIS — E28.2 PCOS (POLYCYSTIC OVARIAN SYNDROME): Primary | ICD-10-CM

## 2024-02-12 ENCOUNTER — PATIENT MESSAGE (OUTPATIENT)
Age: 34
End: 2024-02-12

## 2024-02-12 NOTE — PROGRESS NOTES
Pls call pt and make her aware she needs to have her cortisol level drawn first thing in the am to get most accurate reading. Thank you     Diagnosis Orders   1. PCOS (polycystic ovarian syndrome)  Cortisol AM, Total

## 2024-02-12 NOTE — TELEPHONE ENCOUNTER
From: Madai Slaughter  Sent: 2/12/2024 10:47 AM EST  To: [facility] Clinical Staff  Subject: Additional lab work needed    I have an appointment the 14th at 945am is that adequate?

## 2024-02-14 ENCOUNTER — NURSE ONLY (OUTPATIENT)
Age: 34
End: 2024-02-14

## 2024-02-14 DIAGNOSIS — I10 UNCONTROLLED HYPERTENSION: ICD-10-CM

## 2024-02-14 DIAGNOSIS — R73.03 PREDIABETES: ICD-10-CM

## 2024-02-14 DIAGNOSIS — E28.2 PCOS (POLYCYSTIC OVARIAN SYNDROME): ICD-10-CM

## 2024-02-14 DIAGNOSIS — E78.3 MIXED HYPERGLYCERIDEMIA: ICD-10-CM

## 2024-02-14 DIAGNOSIS — E03.9 HYPOTHYROIDISM, UNSPECIFIED TYPE: ICD-10-CM

## 2024-02-15 LAB — CORTIS AM PEAK SERPL-MCNC: 12 UG/DL (ref 6.2–19.4)

## 2024-02-20 ENCOUNTER — HOSPITAL ENCOUNTER (OUTPATIENT)
Facility: HOSPITAL | Age: 34
Discharge: HOME | End: 2024-02-23

## 2024-02-20 LAB — LABCORP SPECIMEN COLLECTION: NORMAL

## 2024-02-21 ENCOUNTER — OFFICE VISIT (OUTPATIENT)
Age: 34
End: 2024-02-21
Payer: MEDICAID

## 2024-02-21 VITALS
WEIGHT: 160.8 LBS | DIASTOLIC BLOOD PRESSURE: 78 MMHG | SYSTOLIC BLOOD PRESSURE: 122 MMHG | BODY MASS INDEX: 26.79 KG/M2 | HEART RATE: 76 BPM | HEIGHT: 65 IN | RESPIRATION RATE: 18 BRPM | TEMPERATURE: 98.1 F

## 2024-02-21 DIAGNOSIS — I10 UNCONTROLLED HYPERTENSION: ICD-10-CM

## 2024-02-21 DIAGNOSIS — D50.8 IRON DEFICIENCY ANEMIA FOLLOWING BARIATRIC SURGERY: ICD-10-CM

## 2024-02-21 DIAGNOSIS — D68.51 FACTOR 5 LEIDEN MUTATION, HETEROZYGOUS (HCC): ICD-10-CM

## 2024-02-21 DIAGNOSIS — E78.3 MIXED HYPERGLYCERIDEMIA: ICD-10-CM

## 2024-02-21 DIAGNOSIS — E03.9 HYPOTHYROIDISM, UNSPECIFIED TYPE: ICD-10-CM

## 2024-02-21 DIAGNOSIS — E28.2 PCOS (POLYCYSTIC OVARIAN SYNDROME): Primary | ICD-10-CM

## 2024-02-21 DIAGNOSIS — Z31.0 REVERSAL OF STERILIZATION: ICD-10-CM

## 2024-02-21 DIAGNOSIS — S32.040S CLOSED WEDGE COMPRESSION FRACTURE OF L4 VERTEBRA, SEQUELA: ICD-10-CM

## 2024-02-21 DIAGNOSIS — D56.0 ALPHA THALASSEMIA (HCC): ICD-10-CM

## 2024-02-21 DIAGNOSIS — E03.9 HYPOTHYROIDISM, UNSPECIFIED: ICD-10-CM

## 2024-02-21 DIAGNOSIS — R73.03 PREDIABETES: ICD-10-CM

## 2024-02-21 DIAGNOSIS — K95.89 IRON DEFICIENCY ANEMIA FOLLOWING BARIATRIC SURGERY: ICD-10-CM

## 2024-02-21 DIAGNOSIS — I82.451 ACUTE DEEP VEIN THROMBOSIS (DVT) OF RIGHT PERONEAL VEIN (HCC): ICD-10-CM

## 2024-02-21 DIAGNOSIS — F90.0 ATTENTION DEFICIT HYPERACTIVITY DISORDER (ADHD), PREDOMINANTLY INATTENTIVE TYPE: ICD-10-CM

## 2024-02-21 LAB
ALBUMIN SERPL-MCNC: 4.6 G/DL (ref 3.9–4.9)
ALBUMIN/GLOB SERPL: 2.4 {RATIO} (ref 1.2–2.2)
ALP SERPL-CCNC: 55 IU/L (ref 44–121)
ALT SERPL-CCNC: 17 IU/L (ref 0–32)
AST SERPL-CCNC: 17 IU/L (ref 0–40)
BILIRUB SERPL-MCNC: 0.3 MG/DL (ref 0–1.2)
BUN SERPL-MCNC: 10 MG/DL (ref 6–20)
BUN/CREAT SERPL: 14 (ref 9–23)
CALCIUM SERPL-MCNC: 9.6 MG/DL (ref 8.7–10.2)
CHLORIDE SERPL-SCNC: 98 MMOL/L (ref 96–106)
CHOLEST SERPL-MCNC: 250 MG/DL (ref 100–199)
CO2 SERPL-SCNC: 22 MMOL/L (ref 20–29)
CREAT SERPL-MCNC: 0.74 MG/DL (ref 0.57–1)
EGFRCR SERPLBLD CKD-EPI 2021: 109 ML/MIN/1.73
GLOBULIN SER CALC-MCNC: 1.9 G/DL (ref 1.5–4.5)
GLUCOSE SERPL-MCNC: 86 MG/DL (ref 70–99)
HBA1C MFR BLD: 5 % (ref 4.8–5.6)
HDLC SERPL-MCNC: 57 MG/DL
LDLC SERPL CALC-MCNC: 172 MG/DL (ref 0–99)
POTASSIUM SERPL-SCNC: 4.2 MMOL/L (ref 3.5–5.2)
PROT SERPL-MCNC: 6.5 G/DL (ref 6–8.5)
SODIUM SERPL-SCNC: 139 MMOL/L (ref 134–144)
TRIGL SERPL-MCNC: 118 MG/DL (ref 0–149)
TSH SERPL DL<=0.005 MIU/L-ACNC: 1.97 UIU/ML (ref 0.45–4.5)
VLDLC SERPL CALC-MCNC: 21 MG/DL (ref 5–40)

## 2024-02-21 PROCEDURE — 99214 OFFICE O/P EST MOD 30 MIN: CPT | Performed by: NURSE PRACTITIONER

## 2024-02-21 PROCEDURE — 3078F DIAST BP <80 MM HG: CPT | Performed by: NURSE PRACTITIONER

## 2024-02-21 PROCEDURE — 3074F SYST BP LT 130 MM HG: CPT | Performed by: NURSE PRACTITIONER

## 2024-02-21 RX ORDER — LEVOTHYROXINE SODIUM 0.07 MG/1
75 TABLET ORAL
Qty: 90 TABLET | Refills: 1 | OUTPATIENT
Start: 2024-02-21

## 2024-02-21 RX ORDER — DEXTROAMPHETAMINE SACCHARATE, AMPHETAMINE ASPARTATE, DEXTROAMPHETAMINE SULFATE AND AMPHETAMINE SULFATE 2.5; 2.5; 2.5; 2.5 MG/1; MG/1; MG/1; MG/1
10 TABLET ORAL DAILY
COMMUNITY

## 2024-02-21 RX ORDER — ENOXAPARIN SODIUM 100 MG/ML
80 INJECTION SUBCUTANEOUS 2 TIMES DAILY
Qty: 1 EACH | Refills: 0
Start: 2024-02-21

## 2024-02-21 RX ORDER — LEVOTHYROXINE SODIUM 0.07 MG/1
75 TABLET ORAL
Qty: 95 TABLET | Refills: 3 | Status: SHIPPED | OUTPATIENT
Start: 2024-02-21

## 2024-02-21 RX ORDER — LABETALOL 100 MG/1
100 TABLET, FILM COATED ORAL 2 TIMES DAILY
Qty: 186 TABLET | Refills: 1 | Status: SHIPPED | OUTPATIENT
Start: 2024-02-21

## 2024-02-21 RX ORDER — ENOXAPARIN SODIUM 300 MG/3ML
INJECTION INTRAVENOUS; SUBCUTANEOUS 2 TIMES DAILY
COMMUNITY
End: 2024-02-21

## 2024-02-21 RX ORDER — DEXTROAMPHETAMINE SACCHARATE, AMPHETAMINE ASPARTATE MONOHYDRATE, DEXTROAMPHETAMINE SULFATE AND AMPHETAMINE SULFATE 1.25; 1.25; 1.25; 1.25 MG/1; MG/1; MG/1; MG/1
5 CAPSULE, EXTENDED RELEASE ORAL EVERY MORNING
COMMUNITY

## 2024-02-21 SDOH — ECONOMIC STABILITY: INCOME INSECURITY: IN THE LAST 12 MONTHS, WAS THERE A TIME WHEN YOU WERE NOT ABLE TO PAY THE MORTGAGE OR RENT ON TIME?: NO

## 2024-02-21 SDOH — ECONOMIC STABILITY: TRANSPORTATION INSECURITY
IN THE PAST 12 MONTHS, HAS LACK OF TRANSPORTATION KEPT YOU FROM MEETINGS, WORK, OR FROM GETTING THINGS NEEDED FOR DAILY LIVING?: NO

## 2024-02-21 SDOH — ECONOMIC STABILITY: FOOD INSECURITY: WITHIN THE PAST 12 MONTHS, YOU WORRIED THAT YOUR FOOD WOULD RUN OUT BEFORE YOU GOT MONEY TO BUY MORE.: NEVER TRUE

## 2024-02-21 SDOH — ECONOMIC STABILITY: HOUSING INSECURITY: IN THE LAST 12 MONTHS, HOW MANY PLACES HAVE YOU LIVED?: 1

## 2024-02-21 SDOH — ECONOMIC STABILITY: INCOME INSECURITY: HOW HARD IS IT FOR YOU TO PAY FOR THE VERY BASICS LIKE FOOD, HOUSING, MEDICAL CARE, AND HEATING?: NOT VERY HARD

## 2024-02-21 SDOH — ECONOMIC STABILITY: FOOD INSECURITY: WITHIN THE PAST 12 MONTHS, THE FOOD YOU BOUGHT JUST DIDN'T LAST AND YOU DIDN'T HAVE MONEY TO GET MORE.: NEVER TRUE

## 2024-02-21 SDOH — ECONOMIC STABILITY: TRANSPORTATION INSECURITY
IN THE PAST 12 MONTHS, HAS THE LACK OF TRANSPORTATION KEPT YOU FROM MEDICAL APPOINTMENTS OR FROM GETTING MEDICATIONS?: NO

## 2024-02-21 ASSESSMENT — PATIENT HEALTH QUESTIONNAIRE - PHQ9
SUM OF ALL RESPONSES TO PHQ QUESTIONS 1-9: 0
1. LITTLE INTEREST OR PLEASURE IN DOING THINGS: 0
SUM OF ALL RESPONSES TO PHQ QUESTIONS 1-9: 0
2. FEELING DOWN, DEPRESSED OR HOPELESS: 0
SUM OF ALL RESPONSES TO PHQ9 QUESTIONS 1 & 2: 0

## 2024-02-21 ASSESSMENT — SOCIAL DETERMINANTS OF HEALTH (SDOH)
WITHIN THE LAST YEAR, HAVE TO BEEN RAPED OR FORCED TO HAVE ANY KIND OF SEXUAL ACTIVITY BY YOUR PARTNER OR EX-PARTNER?: NO
WITHIN THE LAST YEAR, HAVE YOU BEEN KICKED, HIT, SLAPPED, OR OTHERWISE PHYSICALLY HURT BY YOUR PARTNER OR EX-PARTNER?: NO
WITHIN THE LAST YEAR, HAVE YOU BEEN AFRAID OF YOUR PARTNER OR EX-PARTNER?: NO
WITHIN THE LAST YEAR, HAVE YOU BEEN HUMILIATED OR EMOTIONALLY ABUSED IN OTHER WAYS BY YOUR PARTNER OR EX-PARTNER?: NO

## 2024-02-21 ASSESSMENT — LIFESTYLE VARIABLES
HOW OFTEN DO YOU HAVE A DRINK CONTAINING ALCOHOL: NEVER
HOW MANY STANDARD DRINKS CONTAINING ALCOHOL DO YOU HAVE ON A TYPICAL DAY: PATIENT DOES NOT DRINK

## 2024-02-21 NOTE — PROGRESS NOTES
[facility]  [address]  [address]  [place] [id number]  [phone] office/[phone] fax    2/21/2024    Madai Slaughter 1990 is a pleasant White (non-) female.     Todays concern/HPI:  Factor V. Dr Dobbs prescribing lovenox BID 80mg. Trying to conceive.   Miscarried 2-3w ago. Will retry when significant other returns in Mid March. [facility].  F/u US 3/1/24.   Anemia. Iron infusions scheduled for next week- venefer 2x/w x3w. Taking oral iron supplements. Dr Dobbs.   Rectal bleeding.Dr Dobbs referred to [facility]. Appt next week.   Closed Fx lumbar. Dr Ross referred to Dr Lock for injections. Appt next week. Dr Tinoco recommended dexa. Not wearing brace. Digs into hips-causes pain. Brace for comfort purposes.   Bruising to ABD, hard/swelling ABD. Dr Dobbs ordered US of ABD.  Bladder/rectal prolapse. Pessary. Uro/GYNE appt today ([facility])  ADD. Taking adderall 35mg qd, 10mg in afternoon if she remembers.     Review of Systems - Negative except above    Physical:   /78 (Site: Left Upper Arm, Position: Sitting, Cuff Size: Medium Adult)   Pulse 76   Temp 98.1 °F (36.7 °C) (Temporal)   Resp 18   Ht 1.651 m (5' 5\")   Wt 72.9 kg (160 lb 12.8 oz)   BMI 26.76 kg/m²     Exam:   Physical Exam  Constitutional:       Appearance: Normal appearance.   HENT:      Head: Normocephalic and atraumatic.      Right Ear: Tympanic membrane, ear canal and external ear normal.      Left Ear: Tympanic membrane, ear canal and external ear normal.      Nose: Nose normal.      Mouth/Throat:      Mouth: Mucous membranes are moist.   Eyes:      Extraocular Movements: Extraocular movements intact.      Conjunctiva/sclera: Conjunctivae normal.   Neck:      Vascular: No carotid bruit.   Cardiovascular:      Rate and Rhythm: Normal rate and regular rhythm.      Pulses: Normal pulses.      Heart sounds: Normal heart sounds.   Pulmonary:      Effort: Pulmonary effort is normal. No

## 2024-02-22 PROBLEM — L81.9 DISCOLORATION OF SKIN OF LOWER LEG: Status: RESOLVED | Noted: 2023-08-22 | Resolved: 2024-02-22

## 2024-02-22 PROBLEM — R73.03 PREDIABETES: Status: RESOLVED | Noted: 2022-01-17 | Resolved: 2024-02-22

## 2024-02-22 PROBLEM — R03.0 ELEVATED BLOOD PRESSURE READING IN OFFICE WITHOUT DIAGNOSIS OF HYPERTENSION: Status: RESOLVED | Noted: 2023-01-06 | Resolved: 2024-02-22

## 2024-02-22 PROBLEM — E87.6 HYPOKALEMIA: Status: RESOLVED | Noted: 2023-04-05 | Resolved: 2024-02-22

## 2024-02-22 PROBLEM — I10 UNCONTROLLED HYPERTENSION: Status: RESOLVED | Noted: 2023-02-21 | Resolved: 2024-02-22

## 2024-02-22 LAB
ALBUMIN/CREAT UR: 3 MG/G CREAT (ref 0–29)
CREAT UR-MCNC: 263.2 MG/DL
MICROALBUMIN UR-MCNC: 8.4 UG/ML

## 2024-02-22 NOTE — ASSESSMENT & PLAN NOTE
Monitored by specialist- no acute findings meriting change in the plan  Psych prescribes adderall 35 mg in am and 10mg in pm

## 2024-02-22 NOTE — ASSESSMENT & PLAN NOTE
Monitored by specialist- no acute findings meriting change in the plan  Dr Dobbs  Obtaining iron infusion next week  Continues oral iron supplement

## 2024-02-22 NOTE — ASSESSMENT & PLAN NOTE
Well-controlled, lifestyle modifications recommended  ; -172  Recheck 12 months  Pt working to reduce levels with diet

## 2024-02-22 NOTE — ASSESSMENT & PLAN NOTE
Monitored by specialist- no acute findings meriting change in the plan  Dr Cody Lock-scheduling for injection  Dr Tinoco recommended DEXA after spine heals due to patient's age  Placed order for DEXA

## 2024-02-22 NOTE — ASSESSMENT & PLAN NOTE
Monitored by specialist- no acute findings meriting change in the plan  Dr Dobbs  Off eliquis, on lovenox due to trying to conceive

## 2024-02-22 NOTE — ASSESSMENT & PLAN NOTE
Well-controlled, continue current medications  A1c 5.0  Recheck level 6 months  Continue metformin

## 2024-04-09 ENCOUNTER — PATIENT MESSAGE (OUTPATIENT)
Age: 34
End: 2024-04-09

## 2024-04-09 DIAGNOSIS — Z34.01 PREGNANCY, FIRST, FIRST TRIMESTER: Primary | ICD-10-CM

## 2024-04-09 DIAGNOSIS — Z34.01 PREGNANCY, FIRST, FIRST TRIMESTER: ICD-10-CM

## 2024-04-09 NOTE — TELEPHONE ENCOUNTER
From: Madai Slaughter  To: Betsy White  Sent: 4/9/2024 12:06 PM EDT  Subject: Positive pregnancy     Good afternoon I had a positive pregnancy test last week. While I'm waiting to see [facility] is there any way you could order a 48 hr beta hcg test? My tubal reversal dr recommended I ask

## 2024-04-13 ENCOUNTER — HOSPITAL ENCOUNTER (OUTPATIENT)
Facility: HOSPITAL | Age: 34
Discharge: HOME | End: 2024-04-16

## 2024-04-13 LAB — LABCORP SPECIMEN COLLECTION: NORMAL

## 2024-04-13 PROCEDURE — 99001 SPECIMEN HANDLING PT-LAB: CPT

## 2024-04-14 LAB — B-HCG SERPL QL: POSITIVE MIU/ML

## 2024-05-22 ENCOUNTER — TELEMEDICINE (OUTPATIENT)
Age: 34
End: 2024-05-22

## 2024-05-22 DIAGNOSIS — E16.1 REACTIVE HYPOGLYCEMIA: ICD-10-CM

## 2024-05-22 DIAGNOSIS — Z3A.11 11 WEEKS GESTATION OF PREGNANCY: ICD-10-CM

## 2024-05-22 DIAGNOSIS — L81.9 DISCOLORATION OF SKIN OF LOWER LEG: Primary | ICD-10-CM

## 2024-05-22 DIAGNOSIS — R55 NEUROCARDIOGENIC SYNCOPE: ICD-10-CM

## 2024-05-22 DIAGNOSIS — E03.9 HYPOTHYROIDISM, UNSPECIFIED TYPE: ICD-10-CM

## 2024-05-22 DIAGNOSIS — E28.2 PCOS (POLYCYSTIC OVARIAN SYNDROME): ICD-10-CM

## 2024-05-22 DIAGNOSIS — D56.0 ALPHA THALASSEMIA (HCC): ICD-10-CM

## 2024-05-22 RX ORDER — LEVOTHYROXINE SODIUM 0.1 MG/1
100 TABLET ORAL DAILY
COMMUNITY

## 2024-05-22 NOTE — PROGRESS NOTES
after surgery    Dyslipidemia 01/14/2022    Fatty liver disease, nonalcoholic     confirmed via US    GERD (gastroesophageal reflux disease)     resolved    Hidradenitis suppurativa     History of miscarriage     5 total as of 2016    Hx gestational diabetes 01/14/2022    Hypercholesterolemia     resolved    Hypertension     resolved    Hypokalemia 04/05/2023    Hypothyroidism, unspecified     Intestinal malabsorption 05/19/2016    Iron deficiency anemia following bariatric surgery 09/27/2017    Secondary to gastric bypass. Dr Dobbs [facility], hx iron infusion    Mixed hyperglyceridemia 01/06/2023    Morbid obesity with body mass index of 40.0-49.9 (HCC)     Multiple lung nodules on CT 04/05/2023    Dr Dobbs    PCOS (polycystic ovarian syndrome)     no medication    PTSD (post-traumatic stress disorder) 01/14/2022    PUD (peptic ulcer disease) 2019    Raynaud's phenomenon without gangrene 04/20/2023    Referral sent to vascular    Reversal of sterilization 09/12/2023    S/P bariatric surgery 05/19/2016    5/3/16 - Lap GBP - Mileya     Sleep apnea 2012    no longer an issue per pt. mild, no CPAP needed per sleep study    JESSICA (stress urinary incontinence, female)     Thyroid nodule 04/05/2023    Vitamin D deficiency 01/17/2022     Current Outpatient Medications   Medication Sig    levothyroxine (SYNTHROID) 100 MCG tablet Take 1 tablet by mouth Daily Increased by [facility]    amphetamine-dextroamphetamine (ADDERALL XR) 5 MG extended release capsule Take 1 capsule by mouth every morning. Max Daily Amount: 5 mg    amphetamine-dextroamphetamine (ADDERALL) 10 MG tablet Take 1 tablet by mouth daily. Max Daily Amount: 10 mg    enoxaparin (LOVENOX) 80 MG/0.8ML Inject 0.8 mLs into the skin 2 times daily Prescribed by Dr Dobbs, hemo    labetalol (NORMODYNE) 100 MG tablet Take 1 tablet by mouth 2 times daily    metFORMIN (GLUCOPHAGE) 500 MG tablet Take 1 tablet by mouth 2 times daily (with meals)    calcium

## 2024-05-22 NOTE — ASSESSMENT & PLAN NOTE
The patient's Synthroid prescription has been updated to 100mcg per pt. [facility] monitoring levels and refilling med.

## 2024-05-22 NOTE — ASSESSMENT & PLAN NOTE
The patient reports frequent hypoglycemic episodes, often accompanied by a sensation of impending fainting. She has been previously diagnosed with reactive hypoglycemia. These episodes have been occurring even in the absence of food intake.     [facility] is completing a glycemic test in 1 month

## 2024-05-22 NOTE — ASSESSMENT & PLAN NOTE
Referral to vascular was placed 8/22/2023  Vascular did not reach out to patient to schedule appointment  Due to ongoing symptoms, placed new referral.  Patient seeking to rule out Raynaud's.

## 2024-05-22 NOTE — ASSESSMENT & PLAN NOTE
The patient is encouraged to consult with Dr. Mahoney regarding her neurocardiogenic sinus/ dysautonomia diagnosis.

## 2024-05-22 NOTE — ASSESSMENT & PLAN NOTE
The patient is advised to consult with her hematologist, Dr. Dobbs, regarding her alpha thalassemia diagnosis and NICOLE

## 2024-06-08 DIAGNOSIS — I10 UNCONTROLLED HYPERTENSION: ICD-10-CM

## 2024-06-08 DIAGNOSIS — R73.03 PREDIABETES: ICD-10-CM

## 2024-06-08 DIAGNOSIS — E28.2 PCOS (POLYCYSTIC OVARIAN SYNDROME): ICD-10-CM

## 2024-06-10 RX ORDER — LABETALOL 100 MG/1
100 TABLET, FILM COATED ORAL 2 TIMES DAILY
Qty: 186 TABLET | Refills: 1 | OUTPATIENT
Start: 2024-06-10

## 2024-06-10 RX ORDER — LABETALOL 100 MG/1
100 TABLET, FILM COATED ORAL 2 TIMES DAILY
Qty: 180 TABLET | Refills: 2 | OUTPATIENT
Start: 2024-06-10

## 2024-06-10 NOTE — TELEPHONE ENCOUNTER
Due to her high risk pregnancy, ask her to pls reach out to her OB/GYN for refills of meds to include labetalol. I will resume med refills after she has her massiel baby. Thank you

## 2024-06-27 PROBLEM — D68.51 FACTOR V LEIDEN (HCC): Status: ACTIVE | Noted: 2024-06-27

## 2024-07-01 ENCOUNTER — CLINICAL DOCUMENTATION (OUTPATIENT)
Age: 34
End: 2024-07-01

## 2024-07-01 NOTE — PROGRESS NOTES
L/M to sched NPA w/HP or first available from  Kaleb Ingram APRN - CNP for pulm nod. RAMONK work in [employer]

## 2024-07-09 ENCOUNTER — CLINICAL DOCUMENTATION (OUTPATIENT)
Age: 34
End: 2024-07-09

## 2024-07-15 ENCOUNTER — CLINICAL DOCUMENTATION (OUTPATIENT)
Age: 34
End: 2024-07-15

## 2024-07-30 DIAGNOSIS — R73.03 PREDIABETES: ICD-10-CM

## 2024-07-30 DIAGNOSIS — E28.2 PCOS (POLYCYSTIC OVARIAN SYNDROME): ICD-10-CM

## 2024-07-30 DIAGNOSIS — I10 UNCONTROLLED HYPERTENSION: ICD-10-CM

## 2024-07-30 RX ORDER — LABETALOL 100 MG/1
100 TABLET, FILM COATED ORAL 2 TIMES DAILY
Qty: 186 TABLET | Refills: 1 | OUTPATIENT
Start: 2024-07-30

## 2024-07-30 NOTE — TELEPHONE ENCOUNTER
Due to her high risk pregnancy, ask her to pls reach out to her OB/GYN for refills of meds to include labetalol and metformin if appropriate. I will resume med refills after she has her massiel baby. Thank you

## 2024-08-04 DIAGNOSIS — E28.2 PCOS (POLYCYSTIC OVARIAN SYNDROME): ICD-10-CM

## 2024-08-04 DIAGNOSIS — R73.03 PREDIABETES: ICD-10-CM

## 2024-08-09 DIAGNOSIS — E28.2 PCOS (POLYCYSTIC OVARIAN SYNDROME): ICD-10-CM

## 2024-08-09 DIAGNOSIS — R73.03 PREDIABETES: ICD-10-CM

## 2024-11-23 ENCOUNTER — APPOINTMENT (OUTPATIENT)
Dept: CT IMAGING | Age: 34
End: 2024-11-23
Payer: OTHER GOVERNMENT

## 2024-11-23 ENCOUNTER — HOSPITAL ENCOUNTER (EMERGENCY)
Age: 34
Discharge: HOME | End: 2024-11-23
Attending: EMERGENCY MEDICINE
Payer: OTHER GOVERNMENT

## 2024-11-23 VITALS
SYSTOLIC BLOOD PRESSURE: 116 MMHG | HEART RATE: 81 BPM | RESPIRATION RATE: 15 BRPM | DIASTOLIC BLOOD PRESSURE: 86 MMHG | OXYGEN SATURATION: 99 % | BODY MASS INDEX: 29.99 KG/M2 | HEIGHT: 65 IN | TEMPERATURE: 98.6 F | WEIGHT: 180 LBS

## 2024-11-23 DIAGNOSIS — T81.49XA POSTOPERATIVE WOUND INFECTION: Primary | ICD-10-CM

## 2024-11-23 LAB
ALBUMIN SERPL-MCNC: 3.5 G/DL (ref 3.5–5.2)
ALP SERPL-CCNC: 93 U/L (ref 35–104)
ALT SERPL-CCNC: 14 U/L (ref 0–32)
ANION GAP SERPL CALCULATED.3IONS-SCNC: 13 MMOL/L (ref 7–16)
AST SERPL-CCNC: 11 U/L (ref 0–31)
BACTERIA URNS QL MICRO: ABNORMAL
BASOPHILS # BLD: 0.04 K/UL (ref 0–0.2)
BASOPHILS NFR BLD: 0 % (ref 0–2)
BILIRUB SERPL-MCNC: <0.2 MG/DL (ref 0–1.2)
BILIRUB UR QL STRIP: NEGATIVE
BUN SERPL-MCNC: 8 MG/DL (ref 6–20)
CALCIUM SERPL-MCNC: 9.3 MG/DL (ref 8.6–10.2)
CHLORIDE SERPL-SCNC: 104 MMOL/L (ref 98–107)
CLARITY UR: ABNORMAL
CO2 SERPL-SCNC: 22 MMOL/L (ref 22–29)
COLOR UR: YELLOW
CREAT SERPL-MCNC: 0.7 MG/DL (ref 0.5–1)
EOSINOPHIL # BLD: 0.17 K/UL (ref 0.05–0.5)
EOSINOPHILS RELATIVE PERCENT: 2 % (ref 0–6)
ERYTHROCYTE [DISTWIDTH] IN BLOOD BY AUTOMATED COUNT: 13.5 % (ref 11.5–15)
GFR, ESTIMATED: >90 ML/MIN/1.73M2
GLUCOSE SERPL-MCNC: 96 MG/DL (ref 74–99)
GLUCOSE UR STRIP-MCNC: NEGATIVE MG/DL
HCT VFR BLD AUTO: 36.6 % (ref 34–48)
HGB BLD-MCNC: 11.8 G/DL (ref 11.5–15.5)
HGB UR QL STRIP.AUTO: ABNORMAL
IMM GRANULOCYTES # BLD AUTO: 0.05 K/UL (ref 0–0.58)
IMM GRANULOCYTES NFR BLD: 0 % (ref 0–5)
KETONES UR STRIP-MCNC: NEGATIVE MG/DL
LACTATE BLDV-SCNC: 1.4 MMOL/L (ref 0.5–1.9)
LEUKOCYTE ESTERASE UR QL STRIP: ABNORMAL
LIPASE SERPL-CCNC: 27 U/L (ref 13–60)
LYMPHOCYTES NFR BLD: 1.47 K/UL (ref 1.5–4)
LYMPHOCYTES RELATIVE PERCENT: 13 % (ref 20–42)
MCH RBC QN AUTO: 27.3 PG (ref 26–35)
MCHC RBC AUTO-ENTMCNC: 32.2 G/DL (ref 32–34.5)
MCV RBC AUTO: 84.5 FL (ref 80–99.9)
MONOCYTES NFR BLD: 0.26 K/UL (ref 0.1–0.95)
MONOCYTES NFR BLD: 2 % (ref 2–12)
NEUTROPHILS NFR BLD: 83 % (ref 43–80)
NEUTS SEG NFR BLD: 9.37 K/UL (ref 1.8–7.3)
NITRITE UR QL STRIP: NEGATIVE
PH UR STRIP: 5.5 [PH] (ref 5–9)
PLATELET # BLD AUTO: 502 K/UL (ref 130–450)
PMV BLD AUTO: 8.6 FL (ref 7–12)
POTASSIUM SERPL-SCNC: 3.8 MMOL/L (ref 3.5–5)
PROT SERPL-MCNC: 6.8 G/DL (ref 6.4–8.3)
PROT UR STRIP-MCNC: NEGATIVE MG/DL
RBC # BLD AUTO: 4.33 M/UL (ref 3.5–5.5)
RBC #/AREA URNS HPF: ABNORMAL /HPF
SODIUM SERPL-SCNC: 139 MMOL/L (ref 132–146)
SP GR UR STRIP: 1.02 (ref 1–1.03)
UROBILINOGEN UR STRIP-ACNC: 0.2 EU/DL (ref 0–1)
WBC #/AREA URNS HPF: ABNORMAL /HPF
WBC OTHER # BLD: 11.4 K/UL (ref 4.5–11.5)

## 2024-11-23 PROCEDURE — 99285 EMERGENCY DEPT VISIT HI MDM: CPT

## 2024-11-23 PROCEDURE — 81001 URINALYSIS AUTO W/SCOPE: CPT

## 2024-11-23 PROCEDURE — 80053 COMPREHEN METABOLIC PANEL: CPT

## 2024-11-23 PROCEDURE — 87150 DNA/RNA AMPLIFIED PROBE: CPT

## 2024-11-23 PROCEDURE — 85025 COMPLETE CBC W/AUTO DIFF WBC: CPT

## 2024-11-23 PROCEDURE — 74177 CT ABD & PELVIS W/CONTRAST: CPT

## 2024-11-23 PROCEDURE — 6360000004 HC RX CONTRAST MEDICATION: Performed by: RADIOLOGY

## 2024-11-23 PROCEDURE — 6360000002 HC RX W HCPCS: Performed by: EMERGENCY MEDICINE

## 2024-11-23 PROCEDURE — 83690 ASSAY OF LIPASE: CPT

## 2024-11-23 PROCEDURE — 6370000000 HC RX 637 (ALT 250 FOR IP): Performed by: EMERGENCY MEDICINE

## 2024-11-23 PROCEDURE — 87086 URINE CULTURE/COLONY COUNT: CPT

## 2024-11-23 PROCEDURE — 83605 ASSAY OF LACTIC ACID: CPT

## 2024-11-23 PROCEDURE — 96375 TX/PRO/DX INJ NEW DRUG ADDON: CPT

## 2024-11-23 PROCEDURE — 96374 THER/PROPH/DIAG INJ IV PUSH: CPT

## 2024-11-23 PROCEDURE — 87040 BLOOD CULTURE FOR BACTERIA: CPT

## 2024-11-23 RX ORDER — OXYCODONE HYDROCHLORIDE 5 MG/1
5 TABLET ORAL ONCE
Status: COMPLETED | OUTPATIENT
Start: 2024-11-23 | End: 2024-11-23

## 2024-11-23 RX ORDER — ONDANSETRON 2 MG/ML
4 INJECTION INTRAMUSCULAR; INTRAVENOUS ONCE
Status: COMPLETED | OUTPATIENT
Start: 2024-11-23 | End: 2024-11-23

## 2024-11-23 RX ORDER — SULFAMETHOXAZOLE AND TRIMETHOPRIM 800; 160 MG/1; MG/1
2 TABLET ORAL 2 TIMES DAILY
Qty: 28 TABLET | Refills: 0 | Status: SHIPPED | OUTPATIENT
Start: 2024-11-23 | End: 2024-11-23

## 2024-11-23 RX ORDER — SULFAMETHOXAZOLE AND TRIMETHOPRIM 800; 160 MG/1; MG/1
2 TABLET ORAL ONCE
Status: COMPLETED | OUTPATIENT
Start: 2024-11-23 | End: 2024-11-23

## 2024-11-23 RX ORDER — OXYCODONE HYDROCHLORIDE 5 MG/1
5 TABLET ORAL EVERY 8 HOURS PRN
Qty: 6 TABLET | Refills: 0 | Status: SHIPPED | OUTPATIENT
Start: 2024-11-23 | End: 2024-11-25

## 2024-11-23 RX ORDER — IOPAMIDOL 755 MG/ML
75 INJECTION, SOLUTION INTRAVASCULAR
Status: COMPLETED | OUTPATIENT
Start: 2024-11-23 | End: 2024-11-23

## 2024-11-23 RX ORDER — OXYCODONE HYDROCHLORIDE 5 MG/1
5 TABLET ORAL EVERY 8 HOURS PRN
Qty: 6 TABLET | Refills: 0 | Status: SHIPPED | OUTPATIENT
Start: 2024-11-23 | End: 2024-11-23

## 2024-11-23 RX ORDER — FENTANYL CITRATE 50 UG/ML
50 INJECTION, SOLUTION INTRAMUSCULAR; INTRAVENOUS ONCE
Status: COMPLETED | OUTPATIENT
Start: 2024-11-23 | End: 2024-11-23

## 2024-11-23 RX ORDER — SULFAMETHOXAZOLE AND TRIMETHOPRIM 800; 160 MG/1; MG/1
2 TABLET ORAL 2 TIMES DAILY
Qty: 28 TABLET | Refills: 0 | Status: SHIPPED | OUTPATIENT
Start: 2024-11-23 | End: 2024-11-30

## 2024-11-23 RX ADMIN — SULFAMETHOXAZOLE AND TRIMETHOPRIM 2 TABLET: 800; 160 TABLET ORAL at 16:18

## 2024-11-23 RX ADMIN — IOPAMIDOL 75 ML: 755 INJECTION, SOLUTION INTRAVENOUS at 14:14

## 2024-11-23 RX ADMIN — ONDANSETRON 4 MG: 2 INJECTION, SOLUTION INTRAMUSCULAR; INTRAVENOUS at 11:48

## 2024-11-23 RX ADMIN — OXYCODONE HYDROCHLORIDE 5 MG: 5 TABLET ORAL at 17:16

## 2024-11-23 RX ADMIN — FENTANYL CITRATE 50 MCG: 50 INJECTION, SOLUTION INTRAMUSCULAR; INTRAVENOUS at 11:48

## 2024-11-23 ASSESSMENT — LIFESTYLE VARIABLES
HOW MANY STANDARD DRINKS CONTAINING ALCOHOL DO YOU HAVE ON A TYPICAL DAY: 1 OR 2
HOW OFTEN DO YOU HAVE A DRINK CONTAINING ALCOHOL: NEVER

## 2024-11-23 ASSESSMENT — PAIN DESCRIPTION - ORIENTATION
ORIENTATION: RIGHT;LEFT
ORIENTATION: RIGHT;LEFT

## 2024-11-23 ASSESSMENT — PAIN SCALES - GENERAL
PAINLEVEL_OUTOF10: 7
PAINLEVEL_OUTOF10: 8

## 2024-11-23 ASSESSMENT — PAIN DESCRIPTION - LOCATION
LOCATION: ABDOMEN
LOCATION: ABDOMEN

## 2024-11-23 ASSESSMENT — PAIN DESCRIPTION - DESCRIPTORS
DESCRIPTORS: THROBBING;STABBING;DISCOMFORT
DESCRIPTORS: DISCOMFORT

## 2024-11-23 NOTE — ED NOTES
Radiology Procedure Waiver   Name: Madai Slaughter  : 1990  MRN: [id number]    Date:  24    Time: 1:12 PM EST    Benefits of immediately proceeding with Radiology exam(s) without pre-testing outweigh the risks or are not indicated as specified below and therefore the following is/are being waived:    [x] Pregnancy test   [] Patients LMP on-time and regular.   [] Patient had Tubal Ligation or has other Contraception Device.   [] Patient  is Menopausal or Premenarcheal.    [] Patient had Full or Partial Hysterectomy.    [] Protocol for Iodine allergy    [] MRI Questionnaire     [] BUN/Creatinine   [] Patient age w/no hx of renal dysfunction.   [] Patient on Dialysis.   [] Recent Normal Labs.  Electronically signed by Manuela So MD on 24 at 1:12 PM EST          Recent post partum     Manuela So MD  [phone]

## 2024-11-23 NOTE — ED PROVIDER NOTES
HPI:  24, Time: 11:33 AM JUAN C Slaughter is a 34 y.o. female presenting to the ED for abdominal pain.    I reviewed the patient's chart.  Patient seen in the ED at [facility] on 2024 for postoperative cellulitis status post .  CT abdomen pelvis obtained at that time showed no discrete abscess or area of drainage.  Fluid culture from wound was obtained.  Discharged home on Keflex.    Patient states she has had increased purulent drainage from her wound.  She denies fevers.  States that today she developed sudden severe worsening pain with associated nausea.  Denies fevers though she states she has been taking a lot of Tylenol at home.  Denies chest pain, shortness of breath, cough, diarrhea, constipation.  She denies any vaginal bleeding or abnormal vaginal discharge..  Denies any urinary complaints.  Remote history of gastric bypass in [place].    --------------------------------------------- PAST HISTORY ---------------------------------------------  Past Medical History:  has a past medical history of Acute deep vein thrombosis (DVT) of right peroneal vein ([facility]), Adverse effect of anesthesia, Alpha thalassemia ([facility]), Arthritis, Asthma, Attention deficit hyperactivity disorder (ADHD), predominantly inattentive type, Calculus of kidney, Closed wedge compression fracture of fourth lumbar vertebra ([facility]), Depression, DVT (deep venous thrombosis) ([facility]), Dyslipidemia, Factor V Leiden ([facility]), Fatty liver disease, nonalcoholic, GERD (gastroesophageal reflux disease), Hidradenitis suppurativa, History of miscarriage, Hx gestational diabetes, Hypercholesterolemia, Hypertension, Hypokalemia, Hypothyroidism, unspecified, Intestinal malabsorption, Iron deficiency anemia following bariatric surgery, Mixed hyperglyceridemia, Morbid obesity with body mass index of 40.0-49.9, Multiple lung nodules on CT, PCOS (polycystic ovarian syndrome), PTSD (post-traumatic stress disorder), PUD (peptic ulcer

## 2024-11-24 LAB
ACB COMPLEX DNA BLD POS QL NAA+NON-PROBE: NOT DETECTED
B FRAGILIS DNA BLD POS QL NAA+NON-PROBE: NOT DETECTED
BIOFIRE TEST COMMENT: ABNORMAL
C ALBICANS DNA BLD POS QL NAA+NON-PROBE: NOT DETECTED
C AURIS DNA BLD POS QL NAA+NON-PROBE: NOT DETECTED
C GATTII+NEOFOR DNA BLD POS QL NAA+N-PRB: NOT DETECTED
C GLABRATA DNA BLD POS QL NAA+NON-PROBE: NOT DETECTED
C KRUSEI DNA BLD POS QL NAA+NON-PROBE: NOT DETECTED
C PARAP DNA BLD POS QL NAA+NON-PROBE: NOT DETECTED
C TROPICLS DNA BLD POS QL NAA+NON-PROBE: NOT DETECTED
E CLOAC COMP DNA BLD POS NAA+NON-PROBE: NOT DETECTED
E COLI DNA BLD POS QL NAA+NON-PROBE: NOT DETECTED
E FAECALIS DNA BLD POS QL NAA+NON-PROBE: NOT DETECTED
E FAECIUM DNA BLD POS QL NAA+NON-PROBE: NOT DETECTED
ENTEROBACTERALES DNA BLD POS NAA+N-PRB: NOT DETECTED
GP B STREP DNA BLD POS QL NAA+NON-PROBE: NOT DETECTED
HAEM INFLU DNA BLD POS QL NAA+NON-PROBE: NOT DETECTED
K OXYTOCA DNA BLD POS QL NAA+NON-PROBE: NOT DETECTED
KLEBSIELLA SP DNA BLD POS QL NAA+NON-PRB: NOT DETECTED
KLEBSIELLA SP DNA BLD POS QL NAA+NON-PRB: NOT DETECTED
L MONOCYTOG DNA BLD POS QL NAA+NON-PROBE: NOT DETECTED
MECA+MECC+MREJ ISLT/SPM QL: DETECTED
MICROORGANISM SPEC CULT: ABNORMAL
MICROORGANISM SPEC CULT: ABNORMAL
MICROORGANISM SPEC CULT: NORMAL
MICROORGANISM/AGENT SPEC: ABNORMAL
N MEN DNA BLD POS QL NAA+NON-PROBE: NOT DETECTED
P AERUGINOSA DNA BLD POS NAA+NON-PROBE: NOT DETECTED
PROTEUS SP DNA BLD POS QL NAA+NON-PROBE: NOT DETECTED
S AUREUS DNA BLD POS QL NAA+NON-PROBE: DETECTED
S AUREUS+CONS DNA BLD POS NAA+NON-PROBE: DETECTED
S EPIDERMIDIS DNA BLD POS QL NAA+NON-PRB: NOT DETECTED
S LUGDUNENSIS DNA BLD POS QL NAA+NON-PRB: NOT DETECTED
S MALTOPHILIA DNA BLD POS QL NAA+NON-PRB: NOT DETECTED
S MARCESCENS DNA BLD POS NAA+NON-PROBE: NOT DETECTED
S PNEUM DNA BLD POS QL NAA+NON-PROBE: NOT DETECTED
S PYO DNA BLD POS QL NAA+NON-PROBE: NOT DETECTED
SALMONELLA DNA BLD POS QL NAA+NON-PROBE: NOT DETECTED
SERVICE CMNT-IMP: ABNORMAL
SERVICE CMNT-IMP: ABNORMAL
SERVICE CMNT-IMP: NORMAL
SPECIMEN DESCRIPTION: ABNORMAL
SPECIMEN DESCRIPTION: ABNORMAL
SPECIMEN DESCRIPTION: NORMAL
STREPTOCOCCUS DNA BLD POS NAA+NON-PROBE: NOT DETECTED

## 2024-11-25 LAB
MICROORGANISM SPEC CULT: ABNORMAL
SERVICE CMNT-IMP: ABNORMAL
SPECIMEN DESCRIPTION: ABNORMAL

## 2024-11-26 LAB
ACB COMPLEX DNA BLD POS QL NAA+NON-PROBE: NOT DETECTED
B FRAGILIS DNA BLD POS QL NAA+NON-PROBE: NOT DETECTED
BIOFIRE TEST COMMENT: ABNORMAL
C ALBICANS DNA BLD POS QL NAA+NON-PROBE: NOT DETECTED
C AURIS DNA BLD POS QL NAA+NON-PROBE: NOT DETECTED
C GATTII+NEOFOR DNA BLD POS QL NAA+N-PRB: NOT DETECTED
C GLABRATA DNA BLD POS QL NAA+NON-PROBE: NOT DETECTED
C KRUSEI DNA BLD POS QL NAA+NON-PROBE: NOT DETECTED
C PARAP DNA BLD POS QL NAA+NON-PROBE: NOT DETECTED
C TROPICLS DNA BLD POS QL NAA+NON-PROBE: NOT DETECTED
E CLOAC COMP DNA BLD POS NAA+NON-PROBE: NOT DETECTED
E COLI DNA BLD POS QL NAA+NON-PROBE: NOT DETECTED
E FAECALIS DNA BLD POS QL NAA+NON-PROBE: NOT DETECTED
E FAECIUM DNA BLD POS QL NAA+NON-PROBE: NOT DETECTED
ENTEROBACTERALES DNA BLD POS NAA+N-PRB: NOT DETECTED
GP B STREP DNA BLD POS QL NAA+NON-PROBE: NOT DETECTED
HAEM INFLU DNA BLD POS QL NAA+NON-PROBE: NOT DETECTED
K OXYTOCA DNA BLD POS QL NAA+NON-PROBE: NOT DETECTED
KLEBSIELLA SP DNA BLD POS QL NAA+NON-PRB: NOT DETECTED
KLEBSIELLA SP DNA BLD POS QL NAA+NON-PRB: NOT DETECTED
L MONOCYTOG DNA BLD POS QL NAA+NON-PROBE: NOT DETECTED
MECA+MECC+MREJ ISLT/SPM QL: DETECTED
MICROORGANISM SPEC CULT: ABNORMAL
MICROORGANISM/AGENT SPEC: ABNORMAL
N MEN DNA BLD POS QL NAA+NON-PROBE: NOT DETECTED
P AERUGINOSA DNA BLD POS NAA+NON-PROBE: NOT DETECTED
PROTEUS SP DNA BLD POS QL NAA+NON-PROBE: NOT DETECTED
S AUREUS DNA BLD POS QL NAA+NON-PROBE: DETECTED
S AUREUS+CONS DNA BLD POS NAA+NON-PROBE: DETECTED
S EPIDERMIDIS DNA BLD POS QL NAA+NON-PRB: NOT DETECTED
S LUGDUNENSIS DNA BLD POS QL NAA+NON-PRB: NOT DETECTED
S MALTOPHILIA DNA BLD POS QL NAA+NON-PRB: NOT DETECTED
S MARCESCENS DNA BLD POS NAA+NON-PROBE: NOT DETECTED
S PNEUM DNA BLD POS QL NAA+NON-PROBE: NOT DETECTED
S PYO DNA BLD POS QL NAA+NON-PROBE: NOT DETECTED
SALMONELLA DNA BLD POS QL NAA+NON-PROBE: NOT DETECTED
SERVICE CMNT-IMP: ABNORMAL
SPECIMEN DESCRIPTION: ABNORMAL
STREPTOCOCCUS DNA BLD POS NAA+NON-PROBE: NOT DETECTED

## 2024-11-28 LAB
MICROORGANISM SPEC CULT: NORMAL
SERVICE CMNT-IMP: NORMAL
SPECIMEN DESCRIPTION: NORMAL

## 2025-01-06 NOTE — ASSESSMENT & PLAN NOTE
Monitored by specialist- no acute findings meriting change in the plan  Following closely with [facility] after reversal of her tubal ligation  Unfortunately had a miscarriage 2 to 3 weeks ago  Follow-up ultrasound scheduled for March 2024   Abdomen soft, non-tender, no guarding.